# Patient Record
Sex: MALE | Race: OTHER | HISPANIC OR LATINO
[De-identification: names, ages, dates, MRNs, and addresses within clinical notes are randomized per-mention and may not be internally consistent; named-entity substitution may affect disease eponyms.]

---

## 2017-03-06 ENCOUNTER — APPOINTMENT (OUTPATIENT)
Dept: INTERNAL MEDICINE | Facility: CLINIC | Age: 52
End: 2017-03-06

## 2017-03-06 ENCOUNTER — OTHER (OUTPATIENT)
Age: 52
End: 2017-03-06

## 2017-03-06 ENCOUNTER — MED ADMIN CHARGE (OUTPATIENT)
Age: 52
End: 2017-03-06

## 2017-03-06 VITALS
DIASTOLIC BLOOD PRESSURE: 76 MMHG | SYSTOLIC BLOOD PRESSURE: 120 MMHG | HEART RATE: 74 BPM | TEMPERATURE: 98.1 F | OXYGEN SATURATION: 98 % | BODY MASS INDEX: 23.99 KG/M2 | HEIGHT: 69 IN | WEIGHT: 162 LBS

## 2017-03-06 DIAGNOSIS — Z83.3 FAMILY HISTORY OF DIABETES MELLITUS: ICD-10-CM

## 2017-03-06 DIAGNOSIS — Z83.2 FAMILY HISTORY OF DISEASES OF THE BLOOD AND BLOOD-FORMING ORGANS AND CERTAIN DISORDERS INVOLVING THE IMMUNE MECHANISM: ICD-10-CM

## 2017-03-06 DIAGNOSIS — Z00.00 ENCOUNTER FOR GENERAL ADULT MEDICAL EXAMINATION W/OUT ABNORMAL FINDINGS: ICD-10-CM

## 2017-03-06 DIAGNOSIS — Z82.49 FAMILY HISTORY OF ISCHEMIC HEART DISEASE AND OTHER DISEASES OF THE CIRCULATORY SYSTEM: ICD-10-CM

## 2017-03-06 DIAGNOSIS — Z86.39 PERSONAL HISTORY OF OTHER ENDOCRINE, NUTRITIONAL AND METABOLIC DISEASE: ICD-10-CM

## 2017-03-06 DIAGNOSIS — Z80.42 FAMILY HISTORY OF MALIGNANT NEOPLASM OF PROSTATE: ICD-10-CM

## 2017-03-06 RX ORDER — NAPROXEN 500 MG/1
500 TABLET ORAL
Qty: 28 | Refills: 0 | Status: DISCONTINUED | COMMUNITY
Start: 2017-02-04

## 2017-03-07 DIAGNOSIS — E11.9 TYPE 2 DIABETES MELLITUS W/OUT COMPLICATIONS: ICD-10-CM

## 2017-03-07 LAB
25(OH)D3 SERPL-MCNC: 13.4 NG/ML
ALBUMIN SERPL ELPH-MCNC: 4.7 G/DL
ALP BLD-CCNC: 94 U/L
ALT SERPL-CCNC: 37 U/L
ANION GAP SERPL CALC-SCNC: 17 MMOL/L
AST SERPL-CCNC: 23 U/L
BASOPHILS # BLD AUTO: 0.03 K/UL
BASOPHILS NFR BLD AUTO: 0.4 %
BILIRUB SERPL-MCNC: 0.4 MG/DL
BUN SERPL-MCNC: 14 MG/DL
CALCIUM SERPL-MCNC: 10.2 MG/DL
CHLORIDE SERPL-SCNC: 102 MMOL/L
CHOLEST SERPL-MCNC: 262 MG/DL
CHOLEST/HDLC SERPL: 3.5 RATIO
CO2 SERPL-SCNC: 23 MMOL/L
CREAT SERPL-MCNC: 0.82 MG/DL
EOSINOPHIL # BLD AUTO: 0.19 K/UL
EOSINOPHIL NFR BLD AUTO: 2.4 %
GLUCOSE SERPL-MCNC: 99 MG/DL
HBA1C MFR BLD HPLC: 6.5 %
HCT VFR BLD CALC: 46.9 %
HDLC SERPL-MCNC: 75 MG/DL
HGB BLD-MCNC: 15.1 G/DL
IMM GRANULOCYTES NFR BLD AUTO: 0.1 %
LDLC SERPL CALC-MCNC: 161 MG/DL
LYMPHOCYTES # BLD AUTO: 2.09 K/UL
LYMPHOCYTES NFR BLD AUTO: 26.3 %
MAN DIFF?: NORMAL
MCHC RBC-ENTMCNC: 30.2 PG
MCHC RBC-ENTMCNC: 32.2 GM/DL
MCV RBC AUTO: 93.8 FL
MONOCYTES # BLD AUTO: 0.85 K/UL
MONOCYTES NFR BLD AUTO: 10.7 %
NEUTROPHILS # BLD AUTO: 4.77 K/UL
NEUTROPHILS NFR BLD AUTO: 60.1 %
PLATELET # BLD AUTO: 286 K/UL
POTASSIUM SERPL-SCNC: 4.6 MMOL/L
PROT SERPL-MCNC: 7.6 G/DL
PSA SERPL-MCNC: 1.59 NG/ML
RBC # BLD: 5 M/UL
RBC # FLD: 13.2 %
SODIUM SERPL-SCNC: 142 MMOL/L
TRIGL SERPL-MCNC: 130 MG/DL
TSH SERPL-ACNC: 1.78 UIU/ML
WBC # FLD AUTO: 7.94 K/UL

## 2017-03-09 DIAGNOSIS — E78.5 HYPERLIPIDEMIA, UNSPECIFIED: ICD-10-CM

## 2017-03-10 PROBLEM — E78.5 HYPERLIPIDEMIA LDL GOAL <100: Status: ACTIVE | Noted: 2017-03-10

## 2017-03-10 RX ORDER — ATORVASTATIN CALCIUM 20 MG/1
20 TABLET, FILM COATED ORAL
Qty: 90 | Refills: 1 | Status: ACTIVE | COMMUNITY
Start: 2017-03-10 | End: 1900-01-01

## 2017-03-29 ENCOUNTER — APPOINTMENT (OUTPATIENT)
Dept: GASTROENTEROLOGY | Facility: CLINIC | Age: 52
End: 2017-03-29

## 2017-04-28 ENCOUNTER — APPOINTMENT (OUTPATIENT)
Dept: GASTROENTEROLOGY | Facility: CLINIC | Age: 52
End: 2017-04-28

## 2017-04-28 VITALS — TEMPERATURE: 98.1 F | RESPIRATION RATE: 16 BRPM | DIASTOLIC BLOOD PRESSURE: 80 MMHG | SYSTOLIC BLOOD PRESSURE: 120 MMHG

## 2017-04-28 DIAGNOSIS — M79.672 PAIN IN LEFT FOOT: ICD-10-CM

## 2017-04-28 DIAGNOSIS — Z12.11 ENCOUNTER FOR SCREENING FOR MALIGNANT NEOPLASM OF COLON: ICD-10-CM

## 2017-06-09 ENCOUNTER — APPOINTMENT (OUTPATIENT)
Dept: INTERNAL MEDICINE | Facility: CLINIC | Age: 52
End: 2017-06-09

## 2017-09-27 ENCOUNTER — APPOINTMENT (OUTPATIENT)
Dept: GASTROENTEROLOGY | Facility: HOSPITAL | Age: 52
End: 2017-09-27
Payer: COMMERCIAL

## 2017-09-27 ENCOUNTER — OUTPATIENT (OUTPATIENT)
Dept: OUTPATIENT SERVICES | Facility: HOSPITAL | Age: 52
LOS: 1 days | Discharge: HOME | End: 2017-09-27

## 2017-09-27 DIAGNOSIS — K62.5 HEMORRHAGE OF ANUS AND RECTUM: ICD-10-CM

## 2017-09-27 PROCEDURE — G0121 COLON CA SCRN NOT HI RSK IND: CPT

## 2020-12-15 PROBLEM — Z12.11 ENCOUNTER FOR SCREENING COLONOSCOPY: Status: RESOLVED | Noted: 2017-04-28 | Resolved: 2020-12-15

## 2021-11-08 ENCOUNTER — TRANSCRIPTION ENCOUNTER (OUTPATIENT)
Age: 56
End: 2021-11-08

## 2021-11-08 RX ORDER — POVIDONE-IODINE 5 %
1 AEROSOL (ML) TOPICAL ONCE
Refills: 0 | Status: COMPLETED | OUTPATIENT
Start: 2021-11-09 | End: 2021-11-09

## 2021-11-08 NOTE — H&P ADULT - NSICDXPASTMEDICALHX_GEN_ALL_CORE_FT
PAST MEDICAL HISTORY:  Back pain fall at work in 2020, back injury    DM (diabetes mellitus)     Lumbar radiculopathy

## 2021-11-08 NOTE — H&P ADULT - NSHPPHYSICALEXAM_GEN_ALL_CORE
MSK:  decreased ROM lumbar spine 2/2 pain  Remainder of physical exam as per medical clearance note Gen: 57 y/o, NAD  MSK: Decreased lumbar ROM secondary to pain  Skin without erythema, ecchymosis, abrasions or lesions, signs of infection  Calves soft, nontender bilaterally   Sensation intact to light touch bilateral lower extremities, though notes mild decrease on left thigh compared to right  Pulses: DP 2+; bilat LE brisk capillary refill  EHL/FHL/TA/GS 5/5 bilaterally     Rest of PE per MD clearance

## 2021-11-08 NOTE — H&P ADULT - NSHPLABSRESULTS_GEN_ALL_CORE
preop cbc/cmp/pt/inr/ptt/ua - within normal limits, reviewed by medical clearance   Cr 0.8  preop  covid pcr negative 11/6/21  EKG reviewed by medical clearance  CXR: Within normal limits, per medical clearance.

## 2021-11-08 NOTE — H&P ADULT - PROBLEM SELECTOR PLAN 1
Admit to Orthopaedic Service.  Presents today for elective L5-S1 lumbar decompression and fusion   Pt medically stable and cleared for procedure today by Dr. Elias

## 2021-11-08 NOTE — H&P ADULT - HISTORY OF PRESENT ILLNESS
56M with low back pain x   Presents for elective L5-S1 lumbar decompression and fusion  56M with low back pain since 2020 secondary to a work accident, where he fell down stairs/ladder. Since then he reports low back pain that radiates down both legs, with associated numbness, L>R. He has tried oral medications, injections, physical therapy, rest and time.     Presents for elective L5-S1 lumbar decompression and fusion

## 2021-11-09 ENCOUNTER — INPATIENT (INPATIENT)
Facility: HOSPITAL | Age: 56
LOS: 3 days | Discharge: HOME | DRG: 460 | End: 2021-11-13
Attending: ORTHOPAEDIC SURGERY | Admitting: ORTHOPAEDIC SURGERY
Payer: OTHER MISCELLANEOUS

## 2021-11-09 ENCOUNTER — TRANSCRIPTION ENCOUNTER (OUTPATIENT)
Age: 56
End: 2021-11-09

## 2021-11-09 VITALS
OXYGEN SATURATION: 99 % | WEIGHT: 172.62 LBS | RESPIRATION RATE: 16 BRPM | HEIGHT: 66 IN | SYSTOLIC BLOOD PRESSURE: 132 MMHG | HEART RATE: 80 BPM | DIASTOLIC BLOOD PRESSURE: 82 MMHG | TEMPERATURE: 97 F

## 2021-11-09 DIAGNOSIS — E11.9 TYPE 2 DIABETES MELLITUS WITHOUT COMPLICATIONS: ICD-10-CM

## 2021-11-09 DIAGNOSIS — M54.16 RADICULOPATHY, LUMBAR REGION: ICD-10-CM

## 2021-11-09 LAB
ANION GAP SERPL CALC-SCNC: 10 MMOL/L — SIGNIFICANT CHANGE UP (ref 5–17)
BLD GP AB SCN SERPL QL: NEGATIVE — SIGNIFICANT CHANGE UP
BUN SERPL-MCNC: 12 MG/DL — SIGNIFICANT CHANGE UP (ref 7–23)
CALCIUM SERPL-MCNC: 9.3 MG/DL — SIGNIFICANT CHANGE UP (ref 8.4–10.5)
CHLORIDE SERPL-SCNC: 105 MMOL/L — SIGNIFICANT CHANGE UP (ref 96–108)
CO2 SERPL-SCNC: 25 MMOL/L — SIGNIFICANT CHANGE UP (ref 22–31)
CREAT SERPL-MCNC: 0.77 MG/DL — SIGNIFICANT CHANGE UP (ref 0.5–1.3)
GLUCOSE BLDC GLUCOMTR-MCNC: 112 MG/DL — HIGH (ref 70–99)
GLUCOSE BLDC GLUCOMTR-MCNC: 117 MG/DL — HIGH (ref 70–99)
GLUCOSE BLDC GLUCOMTR-MCNC: 187 MG/DL — HIGH (ref 70–99)
GLUCOSE BLDC GLUCOMTR-MCNC: 187 MG/DL — HIGH (ref 70–99)
GLUCOSE BLDC GLUCOMTR-MCNC: 190 MG/DL — HIGH (ref 70–99)
GLUCOSE SERPL-MCNC: 138 MG/DL — HIGH (ref 70–99)
POTASSIUM SERPL-MCNC: 4.8 MMOL/L — SIGNIFICANT CHANGE UP (ref 3.5–5.3)
POTASSIUM SERPL-SCNC: 4.8 MMOL/L — SIGNIFICANT CHANGE UP (ref 3.5–5.3)
RH IG SCN BLD-IMP: NEGATIVE — SIGNIFICANT CHANGE UP
SODIUM SERPL-SCNC: 140 MMOL/L — SIGNIFICANT CHANGE UP (ref 135–145)

## 2021-11-09 RX ORDER — ONDANSETRON 8 MG/1
4 TABLET, FILM COATED ORAL EVERY 6 HOURS
Refills: 0 | Status: DISCONTINUED | OUTPATIENT
Start: 2021-11-09 | End: 2021-11-09

## 2021-11-09 RX ORDER — ERGOCALCIFEROL 1.25 MG/1
1 CAPSULE ORAL
Qty: 0 | Refills: 0 | DISCHARGE

## 2021-11-09 RX ORDER — CEFAZOLIN SODIUM 1 G
2000 VIAL (EA) INJECTION EVERY 8 HOURS
Refills: 0 | Status: DISCONTINUED | OUTPATIENT
Start: 2021-11-09 | End: 2021-11-09

## 2021-11-09 RX ORDER — CYCLOBENZAPRINE HYDROCHLORIDE 10 MG/1
5 TABLET, FILM COATED ORAL THREE TIMES A DAY
Refills: 0 | Status: DISCONTINUED | OUTPATIENT
Start: 2021-11-09 | End: 2021-11-13

## 2021-11-09 RX ORDER — HYDROMORPHONE HYDROCHLORIDE 2 MG/ML
30 INJECTION INTRAMUSCULAR; INTRAVENOUS; SUBCUTANEOUS
Refills: 0 | Status: DISCONTINUED | OUTPATIENT
Start: 2021-11-09 | End: 2021-11-10

## 2021-11-09 RX ORDER — ACETAMINOPHEN 500 MG
1000 TABLET ORAL ONCE
Refills: 0 | Status: COMPLETED | OUTPATIENT
Start: 2021-11-09 | End: 2021-11-09

## 2021-11-09 RX ORDER — CHLORHEXIDINE GLUCONATE 213 G/1000ML
1 SOLUTION TOPICAL ONCE
Refills: 0 | Status: COMPLETED | OUTPATIENT
Start: 2021-11-09 | End: 2021-11-09

## 2021-11-09 RX ORDER — ACETAMINOPHEN 500 MG
975 TABLET ORAL EVERY 8 HOURS
Refills: 0 | Status: DISCONTINUED | OUTPATIENT
Start: 2021-11-09 | End: 2021-11-13

## 2021-11-09 RX ORDER — DEXTROSE 50 % IN WATER 50 %
15 SYRINGE (ML) INTRAVENOUS ONCE
Refills: 0 | Status: DISCONTINUED | OUTPATIENT
Start: 2021-11-09 | End: 2021-11-13

## 2021-11-09 RX ORDER — GLUCAGON INJECTION, SOLUTION 0.5 MG/.1ML
1 INJECTION, SOLUTION SUBCUTANEOUS ONCE
Refills: 0 | Status: DISCONTINUED | OUTPATIENT
Start: 2021-11-09 | End: 2021-11-13

## 2021-11-09 RX ORDER — METFORMIN HYDROCHLORIDE 850 MG/1
0 TABLET ORAL
Qty: 0 | Refills: 0 | DISCHARGE

## 2021-11-09 RX ORDER — CEFAZOLIN SODIUM 1 G
2000 VIAL (EA) INJECTION EVERY 8 HOURS
Refills: 0 | Status: COMPLETED | OUTPATIENT
Start: 2021-11-09 | End: 2021-11-10

## 2021-11-09 RX ORDER — NALOXONE HYDROCHLORIDE 4 MG/.1ML
0.1 SPRAY NASAL
Refills: 0 | Status: DISCONTINUED | OUTPATIENT
Start: 2021-11-09 | End: 2021-11-13

## 2021-11-09 RX ORDER — HYDROMORPHONE HYDROCHLORIDE 2 MG/ML
0.5 INJECTION INTRAMUSCULAR; INTRAVENOUS; SUBCUTANEOUS
Refills: 0 | Status: DISCONTINUED | OUTPATIENT
Start: 2021-11-09 | End: 2021-11-10

## 2021-11-09 RX ORDER — GABAPENTIN 400 MG/1
300 CAPSULE ORAL ONCE
Refills: 0 | Status: COMPLETED | OUTPATIENT
Start: 2021-11-09 | End: 2021-11-09

## 2021-11-09 RX ORDER — DEXTROSE 50 % IN WATER 50 %
25 SYRINGE (ML) INTRAVENOUS ONCE
Refills: 0 | Status: DISCONTINUED | OUTPATIENT
Start: 2021-11-09 | End: 2021-11-13

## 2021-11-09 RX ORDER — SODIUM CHLORIDE 9 MG/ML
1000 INJECTION, SOLUTION INTRAVENOUS
Refills: 0 | Status: DISCONTINUED | OUTPATIENT
Start: 2021-11-09 | End: 2021-11-13

## 2021-11-09 RX ORDER — ONDANSETRON 8 MG/1
4 TABLET, FILM COATED ORAL EVERY 6 HOURS
Refills: 0 | Status: DISCONTINUED | OUTPATIENT
Start: 2021-11-09 | End: 2021-11-13

## 2021-11-09 RX ORDER — HYDROMORPHONE HYDROCHLORIDE 2 MG/ML
0.5 INJECTION INTRAMUSCULAR; INTRAVENOUS; SUBCUTANEOUS
Refills: 0 | Status: DISCONTINUED | OUTPATIENT
Start: 2021-11-09 | End: 2021-11-13

## 2021-11-09 RX ORDER — APREPITANT 80 MG/1
40 CAPSULE ORAL ONCE
Refills: 0 | Status: COMPLETED | OUTPATIENT
Start: 2021-11-09 | End: 2021-11-09

## 2021-11-09 RX ORDER — DEXTROSE 50 % IN WATER 50 %
12.5 SYRINGE (ML) INTRAVENOUS ONCE
Refills: 0 | Status: DISCONTINUED | OUTPATIENT
Start: 2021-11-09 | End: 2021-11-13

## 2021-11-09 RX ORDER — INSULIN LISPRO 100/ML
VIAL (ML) SUBCUTANEOUS
Refills: 0 | Status: DISCONTINUED | OUTPATIENT
Start: 2021-11-09 | End: 2021-11-13

## 2021-11-09 RX ORDER — SENNA PLUS 8.6 MG/1
2 TABLET ORAL AT BEDTIME
Refills: 0 | Status: DISCONTINUED | OUTPATIENT
Start: 2021-11-09 | End: 2021-11-13

## 2021-11-09 RX ORDER — INSULIN LISPRO 100/ML
VIAL (ML) SUBCUTANEOUS
Refills: 0 | Status: DISCONTINUED | OUTPATIENT
Start: 2021-11-09 | End: 2021-11-09

## 2021-11-09 RX ADMIN — HYDROMORPHONE HYDROCHLORIDE 0.5 MILLIGRAM(S): 2 INJECTION INTRAMUSCULAR; INTRAVENOUS; SUBCUTANEOUS at 13:02

## 2021-11-09 RX ADMIN — APREPITANT 40 MILLIGRAM(S): 80 CAPSULE ORAL at 08:34

## 2021-11-09 RX ADMIN — Medication 975 MILLIGRAM(S): at 22:55

## 2021-11-09 RX ADMIN — Medication 2: at 17:29

## 2021-11-09 RX ADMIN — Medication 975 MILLIGRAM(S): at 21:55

## 2021-11-09 RX ADMIN — CHLORHEXIDINE GLUCONATE 1 APPLICATION(S): 213 SOLUTION TOPICAL at 08:27

## 2021-11-09 RX ADMIN — SENNA PLUS 2 TABLET(S): 8.6 TABLET ORAL at 21:55

## 2021-11-09 RX ADMIN — Medication 100 MILLIGRAM(S): at 19:18

## 2021-11-09 RX ADMIN — Medication 1000 MILLIGRAM(S): at 08:34

## 2021-11-09 RX ADMIN — GABAPENTIN 300 MILLIGRAM(S): 400 CAPSULE ORAL at 08:34

## 2021-11-09 RX ADMIN — HYDROMORPHONE HYDROCHLORIDE 0.5 MILLIGRAM(S): 2 INJECTION INTRAMUSCULAR; INTRAVENOUS; SUBCUTANEOUS at 13:12

## 2021-11-09 RX ADMIN — Medication 1000 MILLIGRAM(S): at 08:35

## 2021-11-09 RX ADMIN — Medication 1 APPLICATION(S): at 08:27

## 2021-11-09 RX ADMIN — Medication 2: at 23:28

## 2021-11-09 RX ADMIN — CYCLOBENZAPRINE HYDROCHLORIDE 5 MILLIGRAM(S): 10 TABLET, FILM COATED ORAL at 21:55

## 2021-11-09 RX ADMIN — HYDROMORPHONE HYDROCHLORIDE 30 MILLILITER(S): 2 INJECTION INTRAMUSCULAR; INTRAVENOUS; SUBCUTANEOUS at 13:52

## 2021-11-09 NOTE — CONSULT NOTE ADULT - SUBJECTIVE AND OBJECTIVE BOX
JEFFERY YUN      Patient is a 56y old  Male who presents with a chief complaint of Low back pain (09 Nov 2021 14:27)      HPI:  56M with low back pain since 2020 secondary to a work accident, where he fell down stairs/ladder. Since then he reports low back pain that radiates down both legs, with associated numbness, L>R. He has tried oral medications, injections, physical therapy, rest and time.     Presents for elective L5-S1 lumbar decompression and fusion  (08 Nov 2021 08:59)      Addl  Medical issues:       HEALTH ISSUES - PROBLEM Dx:  Lumbar radiculopathy    DM (diabetes mellitus)              MEDICATIONS  (STANDING):  acetaminophen     Tablet .. 975 milliGRAM(s) Oral every 8 hours  ceFAZolin   IVPB 2000 milliGRAM(s) IV Intermittent every 8 hours  cyclobenzaprine 5 milliGRAM(s) Oral three times a day  dextrose 40% Gel 15 Gram(s) Oral once  dextrose 5%. 1000 milliLiter(s) (50 mL/Hr) IV Continuous <Continuous>  dextrose 5%. 1000 milliLiter(s) (100 mL/Hr) IV Continuous <Continuous>  dextrose 50% Injectable 25 Gram(s) IV Push once  dextrose 50% Injectable 12.5 Gram(s) IV Push once  dextrose 50% Injectable 25 Gram(s) IV Push once  glucagon  Injectable 1 milliGRAM(s) IntraMuscular once  HYDROmorphone PCA (1 mG/mL) 30 milliLiter(s) PCA Continuous PCA Continuous  insulin lispro (ADMELOG) corrective regimen sliding scale   SubCutaneous three times a day before meals  lactated ringers. 1000 milliLiter(s) (120 mL/Hr) IV Continuous <Continuous>  multivitamin 1 Tablet(s) Oral daily  senna 2 Tablet(s) Oral at bedtime    MEDICATIONS  (PRN):  HYDROmorphone  Injectable 0.5 milliGRAM(s) IV Push every 15 minutes PRN Severe Pain (7 - 10)  HYDROmorphone PCA (1 mG/mL) Rescue Clinician Bolus 0.5 milliGRAM(s) IV Push every 2 hours PRN for Pain Scale GREATER THAN 6  naloxone Injectable 0.1 milliGRAM(s) IV Push every 3 minutes PRN For ANY of the following changes in patient status:  A. RR LESS THAN 10 breaths per minute, B. Oxygen saturation LESS THAN 90%, C. Sedation score of 6  ondansetron Injectable 4 milliGRAM(s) IV Push every 6 hours PRN Nausea          PAST MEDICAL & SURGICAL HISTORY:  Lumbar radiculopathy    DM (diabetes mellitus)    Back pain  fall at work in 2020, back injury    PHYSICAL EXAM:      Constitutional: NAD, well-groomed, well-developed  HEENT: PERRLA, EOMI, Normal Hearing, MMM  Neck: No LAD, No JVD  Back: Normal spine flexure, No CVA tenderness  Respiratory: CTABCardiovascular: S1 and S2, RRR, no M/G/R  Gastrointestinal: BS+, soft, NT/ND  Extremities: No peripheral edema  Vascular: 2+ peripheral pulses  Neurological: A/O x 3, no focal deficits  Psychiatric: Normal mood, normal affect  Musculoskeletal: 5/5 strength b/l upper and lower extremities  Skin: No rashes    No significant past surgical history        REVIEW OF SYSTEMS:  [x] As per HPI          Reviewed   no change                            Changes noted  CONSTITUTIONAL: No fever, weight loss, or fatigue  RESPIRATORY: No cough, wheezing, chills or hemoptysis; No Shortness of Breath  CARDIOVASCULAR: No chest pain, palpitations, dizziness, or leg swelling  GASTROINTESTINAL: No abdominal or epigastric pain. No nausea, vomiting, or hematemesis; No diarrhea or constipation. No melena or hematochezia.  MUSCULOSKELETAL: No joint pain or swelling; No muscle, back, or extremity pain  l5  s1  procedure  Neuro:   Grossly  Negative  Psych        Awake  alert  [x] All others negative	  [ ] Unable to obtain      Vital Signs Last 24 Hrs  T(C): 36.8 (09 Nov 2021 16:04), Max: 36.8 (09 Nov 2021 16:04)  T(F): 98.3 (09 Nov 2021 16:04), Max: 98.3 (09 Nov 2021 16:04)  HR: 85 (09 Nov 2021 16:04) (73 - 85)  BP: 100/67 (09 Nov 2021 16:04) (100/62 - 132/82)  BP(mean): 94 (09 Nov 2021 14:15) (76 - 95)  RR: 16 (09 Nov 2021 16:04) (13 - 18)  SpO2: 98% (09 Nov 2021 16:04) (98% - 100%)    PHYSICAL EXAM:    PHYSICAL EXAM:      Constitutional: NAD, well-groomed, well-developed  HEENT: PERRLA, EOMI, Normal Hearing, MMM  Neck: No LAD, No JVD  Back: Normal spine flexure, No CVA tenderness  Respiratory: CTA B  Cardiovascular: S1 and S2, RRR, no M/G/R  Gastrointestinal: BS+, soft, NT/ND  Extremities: No peripheral edema  Vascular: 2+ peripheral pulses  Neurological: A/O x 3, no focal deficits  Psychiatric: Normal mood, normal affect  Musculoskeletal: 5/5 strength s/p L spine  Skin: No rashes              11-09    140  |  105  |  12  ----------------------------<  138<H>  4.8   |  25  |  0.77    Ca    9.3      09 Nov 2021 14:07            CAPILLARY BLOOD GLUCOSE      POCT Blood Glucose.: 187 mg/dL (09 Nov 2021 17:13)  POCT Blood Glucose.: 117 mg/dL (09 Nov 2021 13:00)  POCT Blood Glucose.: 112 mg/dL (09 Nov 2021 07:57)      I&O's Summary          ASSESSMENT/PLAN/RECOMMENDATIONS    
  Pain Management Consult Note - [facility] [phone]    Chief Complaint: low back pain     HPI: Patient seen and examined today. This is a 57 y/o male PMH of DM scheduled for lumbar decompression and fusion L5-S1 with Dr. Dale. Patient reports endorsing low back pain since a work related accident in 2/21/2020. Patient reports low back pain radiates down the bilateral lower extremities, left side worse than right side. Patient reports associated numbness, tingling, and weakness in the bilateral lower extremities. At home, patient reports taking Ibuprofen and a muscle relaxer that he does not recall the name. Patient is ISTOP negative. Discussed plan to start Dilaudid PCA post-operatively, patient questions were answered and patient verbalized understanding.   reference ID: [id number]    Pain is __X_ sharp ____dull ___burning ___achy ___ Intensity: ____ mild _X__mod __X_severe     Location __X__surgical site ____cervical _X____lumbar ____abd ____upper ext____lower ext    Worse with _X___activity _X___movement _____physical therapy___ Rest    Improved with __X__medication __X__rest ____physical therapy    ROS: Const:  __N_febrile   Eyes:___ENT:___CV: N___chest pain  Resp: ___N_sob  GI:__N_nausea _N__vomiting _N__abd pain _Y__npo ___clears __full diet __bm  :___ Musk: _Y__pain ___spasm  Skin:__N_ Neuro:  _N__sedation_N__confusion__Y_ numbness _Y__weakness Y___paresth  Psych:__anxiety  Endo:__N_ Heme:_N__Allergy:_____NKDA____, __Y_all others reviewed and negative    PAST MEDICAL & SURGICAL HISTORY:  Lumbar radiculopathy  DM (diabetes mellitus)  Back pain  fall at work in 2020, back injury  No significant past surgical history    SH: _N__Tobacco   _N__Alcohol                          FH:FAMILY HISTORY: no pertinent medical history in first degree relatives    T(C): 36.3 (11-09-21 @ 08:06), Max: 36.3 (11-09-21 @ 08:06)  HR: 80 (11-09-21 @ 08:06) (80 - 80)  BP: 132/82 (11-09-21 @ 08:06) (132/82 - 132/82)  RR: 16 (11-09-21 @ 08:06) (16 - 16)  SpO2: 99% (11-09-21 @ 08:06) (99% - 99%)  Wt(kg): --    T(C): 36.3 (11-09-21 @ 08:06), Max: 36.3 (11-09-21 @ 08:06)  HR: 80 (11-09-21 @ 08:06) (80 - 80)  BP: 132/82 (11-09-21 @ 08:06) (132/82 - 132/82)  RR: 16 (11-09-21 @ 08:06) (16 - 16)  SpO2: 99% (11-09-21 @ 08:06) (99% - 99%)  Wt(kg): --    T(C): 36.3 (11-09-21 @ 08:06), Max: 36.3 (11-09-21 @ 08:06)  HR: 80 (11-09-21 @ 08:06) (80 - 80)  BP: 132/82 (11-09-21 @ 08:06) (132/82 - 132/82)  RR: 16 (11-09-21 @ 08:06) (16 - 16)  SpO2: 99% (11-09-21 @ 08:06) (99% - 99%)  Wt(kg): --    PHYSICAL EXAM:  Gen Appearance: _X__no acute distress __X_appropriate        Neuro: _X__SILT feet____ EOM Intact Psych: AAOX_3_, _X__mood/affect appropriate        Eyes: _X__conjunctiva WNL  __X___ Pupils equal and round        ENT: X___ears and nose atraumatic_X__ Hearing grossly intact        Neck: _X__trachea midline, no visible masses ___thyroid without palpable mass    Resp: _X__Nml WOB____No tactile fremitus ___clear to auscultation    Cardio: _X__extremities free from edema _X___pedal pulses palpable    GI/Abdomen: ___soft _____ Nontender______Nondistended_____HSM    Lymphatic: ___no palpable nodes in neck  ___no palpable nodes calves and feet    Skin/Wound: ___Incision, ___Dressing c/d/i,   ____surrounding tissues soft,  ___drain/chest tube present____    Muscular: EHL _5_/5  Gastrocnemius__5_/5    _X__absent clubbing/cyanosis      ASSESSMENT: This is a 56y old Male with a history of DM scheduled for lumbar decompression and fusion L5-S1 with Dr. Dale.    Recommended Treatment PLAN:  1. Dilaudid PCA 0.2 mg [id number], 9 mg 4hr max; Dilaudid 0.5 mg 2 hr rescue clinician bolus  2. Tylenol 975 mg PO TID  3. Flexeril 5 mg PO TID  Plan discussed with Dr. Hidalgo

## 2021-11-09 NOTE — PROGRESS NOTE ADULT - SUBJECTIVE AND OBJECTIVE BOX
Ortho Post Op Check    Procedure: L5-S1 PSF  Surgeon:    Pt comfortable without complaints, pain controlled  Denies CP, SOB, N/V  Reports bilateral LE numbness/tingling but reports that there has been no change since before the procedure    Vital Signs Last 24 Hrs  T(C): 36.2 (11-09-21 @ 12:45), Max: 36.2 (11-09-21 @ 12:45)  T(F): 97.1 (11-09-21 @ 12:45), Max: 97.1 (11-09-21 @ 12:45)  HR: 75 (11-09-21 @ 13:55) (73 - 84)  BP: 126/72 (11-09-21 @ 13:55) (100/62 - 126/72)  BP(mean): 93 (11-09-21 @ 13:55) (76 - 95)  RR: 15 (11-09-21 @ 13:55) (13 - 18)  SpO2: --  I&O's Summary      General: Pt Alert and oriented, NAD  DSG C/D/I  Pulses: 2+ PT/DP bilaterally  Sensation: SILT bilaterally  Motor: 4-5/5 EHL/FHL/TA/GS bilaterally              Post-op X-Ray:    A/P: 56yMale POD#0 s/p L5-S1 PSF  - Stable, VSS  - Pain Control  - DVT ppx: SCDs  - Post op abx: Ancef  - PT, WBS: WBAT  - Dispo: Pending PT evaluation    Ortho Pager [phone] Ortho Post Op Check    Procedure: L5-S1 PSF  Surgeon: Suyapa    Pt comfortable without complaints, pain controlled  Denies CP, SOB, N/V  Reports bilateral LE numbness/tingling but reports that there has been no change since before the procedure    Vital Signs Last 24 Hrs  T(C): 36.2 (11-09-21 @ 12:45), Max: 36.2 (11-09-21 @ 12:45)  T(F): 97.1 (11-09-21 @ 12:45), Max: 97.1 (11-09-21 @ 12:45)  HR: 75 (11-09-21 @ 13:55) (73 - 84)  BP: 126/72 (11-09-21 @ 13:55) (100/62 - 126/72)  BP(mean): 93 (11-09-21 @ 13:55) (76 - 95)  RR: 15 (11-09-21 @ 13:55) (13 - 18)  SpO2: --  I&O's Summary      General: Pt Alert and oriented, NAD  DSG C/D/I  Pulses: 2+ PT/DP bilaterally  Sensation: SILT bilaterally  Motor: 4-5/5 EHL/FHL/TA/GS bilaterally              Post-op X-Ray:    A/P: 56yMale POD#0 s/p L5-S1 PSF  - Stable, VSS  - Pain Control  - DVT ppx: SCDs  - Post op abx: Ancef  - PT, WBS: WBAT  - Dispo: Pending PT evaluation    Ortho Pager [phone] Ortho Post Op Check    Procedure: L5-S1 PSF  Surgeon: Suyapa    Pt comfortable without complaints, pain controlled.  Denies CP, SOB, N/V  Reports bilateral LE numbness/tingling but reports that there has been no change since before the procedure.    Vital Signs Last 24 Hrs  T(C): 36.2 (11-09-21 @ 12:45), Max: 36.2 (11-09-21 @ 12:45)  T(F): 97.1 (11-09-21 @ 12:45), Max: 97.1 (11-09-21 @ 12:45)  HR: 75 (11-09-21 @ 13:55) (73 - 84)  BP: 126/72 (11-09-21 @ 13:55) (100/62 - 126/72)  BP(mean): 93 (11-09-21 @ 13:55) (76 - 95)  RR: 15 (11-09-21 @ 13:55) (13 - 18)  SpO2: --  I&O's Summary      General: Pt Alert and oriented, NAD  DSG C/D/I  Hemovac x1 to lumbar area, intact  Pulses: 2+ PT/DP bilaterally  Sensation: SILT bilaterally  Motor: 5/5 EHL/FHL/TA/GS bilaterally          A/P: 56yMale POD#0 s/p L5-S1 PSF  - Stable, VSS  - Pain Control  - DVT ppx: SCDs  - Post op abx: Ancef  - PT, WBS: WBAT  - Dispo: Pending PT evaluation    Ortho Pager [phone] Ortho Post Op Check    Procedure: L5-S1 PSF  Surgeon: Suyapa    Pt comfortable without complaints, pain controlled.  Denies CP, SOB, N/V  Reports bilateral LE numbness/tingling but reports that there has been no change since before the procedure.    Vital Signs Last 24 Hrs  T(C): 36.2 (11-09-21 @ 12:45), Max: 36.2 (11-09-21 @ 12:45)  T(F): 97.1 (11-09-21 @ 12:45), Max: 97.1 (11-09-21 @ 12:45)  HR: 75 (11-09-21 @ 13:55) (73 - 84)  BP: 126/72 (11-09-21 @ 13:55) (100/62 - 126/72)  BP(mean): 93 (11-09-21 @ 13:55) (76 - 95)  RR: 15 (11-09-21 @ 13:55) (13 - 18)  SpO2: --  I&O's Summary      General: Pt Alert and oriented, NAD  DSG C/D/I  Hemovac x1 to lumbar area, intact  Pulses: 2+ PT/DP bilaterally  Sensation: SILT bilaterally  Motor: 5/5 EHL/FHL/TA/GS bilaterally          A/P: 56yMale POD#0 s/p L5-S1 PSF  - Stable, VSS  - Pain Control  - DVT ppx: SCDs  - Post op abx: Ancef  - PT, WBS: WBAT  - Dispo: Pending PT evaluation    Ortho Pager [phone]  Pt. seen and evaluated with MALI. Agree with above and edited.   Ros Ochoa PA-C

## 2021-11-09 NOTE — DISCHARGE NOTE PROVIDER - CARE PROVIDER_API CALL
Chacho Dale (DO)  Orthopaedic Surgery  [address] [place], [place] [address]  Phone: [phone]  Fax: [phone]  Follow Up Time: 2 weeks

## 2021-11-09 NOTE — DISCHARGE NOTE PROVIDER - HOSPITAL COURSE
Admitted 11/9/21  Surgery  L5-S1 lumbar decompression and fusion   Jazmine-op Antibiotics  Pain control  DVT prophylaxis  OOB/Physical Therapy

## 2021-11-09 NOTE — DISCHARGE NOTE PROVIDER - NSDCMRMEDTOKEN_GEN_ALL_CORE_FT
ergocalciferol 1.25 mg (50,000 intl units) oral capsule: 1 cap(s) orally once a week  ibuprofen 600 mg oral tablet: 1 tab(s) orally every 6 hours, As Needed  metFORMIN 500 mg oral tablet: orally once a day   acetaminophen 325 mg oral tablet: 3 tab(s) orally every 8 hours  cyclobenzaprine 5 mg oral tablet: 1 tab(s) orally 3 times a day  ergocalciferol 1.25 mg (50,000 intl units) oral capsule: 1 cap(s) orally once a week  Lumbar corset x 1 s/p Lami/PSF L5-S1 :   melatonin 5 mg oral tablet: 1 tab(s) orally once a day (at bedtime), As needed, Insomnia  metFORMIN 500 mg oral tablet: orally once a day  Multiple Vitamins oral tablet: 1 tab(s) orally once a day  oxyCODONE 10 mg oral tablet: 1 tab(s) orally every 4 hours, As needed, Severe Pain (7 - 10)  oxyCODONE 5 mg oral tablet: 1 tab(s) orally every 4 hours, As needed, Moderate Pain (4 - 6)  pantoprazole 40 mg oral delayed release tablet: 1 tab(s) orally once a day (before a meal)  senna oral tablet: 2 tab(s) orally once a day (at bedtime)   acetaminophen 325 mg oral tablet: 3 tab(s) orally every 8 hours  cyclobenzaprine 5 mg oral tablet: 1 tab(s) orally 3 times a day MDD:3  ergocalciferol 1.25 mg (50,000 intl units) oral capsule: 1 cap(s) orally once a week  Keflex 500 mg oral capsule: 1 cap(s) orally 2 times a day MDD:2  Lumbar corset x 1 s/p Lami/PSF L5-S1 :   Medrol Dosepak 4 mg oral tablet: 1 packet(s) orally once a day   take as directed on packet  melatonin 5 mg oral tablet: 1 tab(s) orally once a day (at bedtime), As needed, Insomnia  metFORMIN 500 mg oral tablet: orally once a day  Multiple Vitamins oral tablet: 1 tab(s) orally once a day  oxyCODONE 5 mg oral tablet: 1 tab(s) orally every 4 hours, As needed, severe Pain (7 - 10) MDD:6  pantoprazole 40 mg oral delayed release tablet: 1 tab(s) orally once a day (before a meal)  senna oral tablet: 2 tab(s) orally once a day (at bedtime)   acetaminophen 325 mg oral tablet: 3 tab(s) orally every 8 hours  cyclobenzaprine 5 mg oral tablet: 1 tab(s) orally 3 times a day MDD:3  ergocalciferol 1.25 mg (50,000 intl units) oral capsule: 1 cap(s) orally once a week  Keflex 500 mg oral capsule: 1 cap(s) orally 2 times a day MDD:2  Lumbar corset x 1 s/p Lami/PSF L5-S1 :   Medrol Dosepak 4 mg oral tablet: 1 packet(s) orally once a day   take as directed on packet  melatonin 5 mg oral tablet: 1 tab(s) orally once a day (at bedtime), As needed, Insomnia  metFORMIN 500 mg oral tablet: orally once a day  Multiple Vitamins oral tablet: 1 tab(s) orally once a day  oxyCODONE 5 mg oral tablet: 1 tab(s) orally every 4 hours, As needed, severe Pain (7 - 10) MDD:6  pantoprazole 40 mg oral delayed release tablet: 1 tab(s) orally once a day (before a meal)  PHYSICAL THERAPY: DX: Lumbar radiculopathy  Surgery: PSF L5-S1    OUTPT PT 2-3x/week for 6-8 weeks  senna oral tablet: 2 tab(s) orally once a day (at bedtime)

## 2021-11-09 NOTE — DISCHARGE NOTE PROVIDER - NSDCACTIVITY_GEN_ALL_CORE
Do not drive or operate machinery/Showering allowed/No heavy lifting/straining/Follow Instructions Provided by your Surgical Team

## 2021-11-09 NOTE — DISCHARGE NOTE PROVIDER - NSDCCPTREATMENT_GEN_ALL_CORE_FT
PRINCIPAL PROCEDURE  Procedure: Fusion, posterior spinal column, lumbar, 1 level, posterior approach  Findings and Treatment: lumbar radiculopathy

## 2021-11-09 NOTE — BRIEF OPERATIVE NOTE - NSICDXBRIEFPROCEDURE_GEN_ALL_CORE_FT
PROCEDURES:  Fusion, posterior spinal column, lumbar, 1 level, posterior approach 09-Nov-2021 15:48:25  Goldberg, Rebecca L

## 2021-11-09 NOTE — CONSULT NOTE ADULT - ATTENDING COMMENTS
56 y.o. male going for lumbar fusion surgery.  Pt was evaluated for pain control. Chart reviewed, medications reviewed. We will consider comprehensive medical management and titrate pain control and side effects. We will follow up. Dr. Hidalgo

## 2021-11-09 NOTE — DISCHARGE NOTE PROVIDER - NSDCCPCAREPLAN_GEN_ALL_CORE_FT
PRINCIPAL DISCHARGE DIAGNOSIS  Diagnosis: Lumbar radiculopathy  Assessment and Plan of Treatment:        PRINCIPAL DISCHARGE DIAGNOSIS  Diagnosis: Lumbar radiculopathy  Assessment and Plan of Treatment: s/p L5-S1 lami/psf

## 2021-11-09 NOTE — DISCHARGE NOTE PROVIDER - NSDCFUADDINST_GEN_ALL_CORE_FT
Weight bear as tolerated with assistive device.    No strenuous activity, heavy lifting, driving, exercising or returning to work until cleared by MD.    You may shower - dressing is water-resistant, no soaking in bathtubs.    Remove dressing after post op day 5-7, then leave incision open to air. Keep incision clean and dry.    Try to have regular bowel movements, take stool softener or laxative if necessary.    May take Pepcid or Prilosec for upset stomach.    May take Aleve or Naproxen if needed.    Do not apply any creams or ointments on the incision or around the incision/dressing.    Swelling may travel all the way down leg to foot, this is normal and will subside in a few weeks.    Call to schedule an appt with Dr. Dale for follow up, if you have staples or sutures they will be removed in office.    Contact your doctor if you experience: fever greater than 101.5, chills, chest pain, difficulty breathing, redness or excessive drainage around the incision, other concerns.    Follow up with your primary care provider.   Weight bear as tolerated with assistive device.    No strenuous activity, heavy lifting, driving, exercising or returning to work until cleared by MD.    You may shower - dressing is water-resistant, no soaking in bathtubs.    Remove dressing after post op day 5-7, then leave incision open to air. Keep incision clean and dry.    Try to have regular bowel movements, take stool softener or laxative if necessary.    May take Pepcid or Prilosec for upset stomach.    May take Aleve or Naproxen if needed.    Do not apply any creams or ointments on the incision or around the incision/dressing.    Swelling may travel all the way down leg to foot, this is normal and will subside in a few weeks.    Call to schedule an appt with Dr. Dale for follow up, if you have staples or sutures they will be removed in office.    Contact your doctor if you experience: fever greater than 101.5, chills, chest pain, difficulty breathing, redness or excessive drainage around the incision, other concerns.    Follow up with your primary care provider.    Take the antibiotic and the medrol dose pack completely and as prescribed.

## 2021-11-10 LAB
A1C WITH ESTIMATED AVERAGE GLUCOSE RESULT: 6 % — HIGH (ref 4–5.6)
ANION GAP SERPL CALC-SCNC: 9 MMOL/L — SIGNIFICANT CHANGE UP (ref 5–17)
BASOPHILS # BLD AUTO: 0.02 K/UL — SIGNIFICANT CHANGE UP (ref 0–0.2)
BASOPHILS NFR BLD AUTO: 0.2 % — SIGNIFICANT CHANGE UP (ref 0–2)
BUN SERPL-MCNC: 10 MG/DL — SIGNIFICANT CHANGE UP (ref 7–23)
CALCIUM SERPL-MCNC: 9.1 MG/DL — SIGNIFICANT CHANGE UP (ref 8.4–10.5)
CHLORIDE SERPL-SCNC: 103 MMOL/L — SIGNIFICANT CHANGE UP (ref 96–108)
CO2 SERPL-SCNC: 24 MMOL/L — SIGNIFICANT CHANGE UP (ref 22–31)
COVID-19 NUCLEOCAPSID GAM AB INTERP: POSITIVE
COVID-19 NUCLEOCAPSID TOTAL GAM ANTIBODY RESULT: 1.92 INDEX — HIGH
COVID-19 SPIKE DOMAIN AB INTERP: POSITIVE
COVID-19 SPIKE DOMAIN ANTIBODY RESULT: 53.3 U/ML — HIGH
CREAT SERPL-MCNC: 0.7 MG/DL — SIGNIFICANT CHANGE UP (ref 0.5–1.3)
EOSINOPHIL # BLD AUTO: 0 K/UL — SIGNIFICANT CHANGE UP (ref 0–0.5)
EOSINOPHIL NFR BLD AUTO: 0 % — SIGNIFICANT CHANGE UP (ref 0–6)
ESTIMATED AVERAGE GLUCOSE: 126 MG/DL — HIGH (ref 68–114)
GLUCOSE BLDC GLUCOMTR-MCNC: 142 MG/DL — HIGH (ref 70–99)
GLUCOSE BLDC GLUCOMTR-MCNC: 163 MG/DL — HIGH (ref 70–99)
GLUCOSE BLDC GLUCOMTR-MCNC: 164 MG/DL — HIGH (ref 70–99)
GLUCOSE BLDC GLUCOMTR-MCNC: 166 MG/DL — HIGH (ref 70–99)
GLUCOSE BLDC GLUCOMTR-MCNC: 186 MG/DL — HIGH (ref 70–99)
GLUCOSE SERPL-MCNC: 186 MG/DL — HIGH (ref 70–99)
HCT VFR BLD CALC: 37.8 % — LOW (ref 39–50)
HCV AB S/CO SERPL IA: 0.04 S/CO — SIGNIFICANT CHANGE UP
HCV AB SERPL-IMP: SIGNIFICANT CHANGE UP
HGB BLD-MCNC: 12.7 G/DL — LOW (ref 13–17)
IMM GRANULOCYTES NFR BLD AUTO: 0.3 % — SIGNIFICANT CHANGE UP (ref 0–1.5)
LYMPHOCYTES # BLD AUTO: 1.18 K/UL — SIGNIFICANT CHANGE UP (ref 1–3.3)
LYMPHOCYTES # BLD AUTO: 9.7 % — LOW (ref 13–44)
MCHC RBC-ENTMCNC: 32 PG — SIGNIFICANT CHANGE UP (ref 27–34)
MCHC RBC-ENTMCNC: 33.6 GM/DL — SIGNIFICANT CHANGE UP (ref 32–36)
MCV RBC AUTO: 95.2 FL — SIGNIFICANT CHANGE UP (ref 80–100)
MONOCYTES # BLD AUTO: 1.3 K/UL — HIGH (ref 0–0.9)
MONOCYTES NFR BLD AUTO: 10.7 % — SIGNIFICANT CHANGE UP (ref 2–14)
NEUTROPHILS # BLD AUTO: 9.6 K/UL — HIGH (ref 1.8–7.4)
NEUTROPHILS NFR BLD AUTO: 79.1 % — HIGH (ref 43–77)
NRBC # BLD: 0 /100 WBCS — SIGNIFICANT CHANGE UP (ref 0–0)
PLATELET # BLD AUTO: 211 K/UL — SIGNIFICANT CHANGE UP (ref 150–400)
POTASSIUM SERPL-MCNC: 4.3 MMOL/L — SIGNIFICANT CHANGE UP (ref 3.5–5.3)
POTASSIUM SERPL-SCNC: 4.3 MMOL/L — SIGNIFICANT CHANGE UP (ref 3.5–5.3)
RBC # BLD: 3.97 M/UL — LOW (ref 4.2–5.8)
RBC # FLD: 12.1 % — SIGNIFICANT CHANGE UP (ref 10.3–14.5)
SARS-COV-2 IGG+IGM SERPL QL IA: 1.92 INDEX — HIGH
SARS-COV-2 IGG+IGM SERPL QL IA: 53.3 U/ML — HIGH
SARS-COV-2 IGG+IGM SERPL QL IA: POSITIVE
SARS-COV-2 IGG+IGM SERPL QL IA: POSITIVE
SODIUM SERPL-SCNC: 136 MMOL/L — SIGNIFICANT CHANGE UP (ref 135–145)
WBC # BLD: 12.14 K/UL — HIGH (ref 3.8–10.5)
WBC # FLD AUTO: 12.14 K/UL — HIGH (ref 3.8–10.5)

## 2021-11-10 RX ORDER — OXYCODONE HYDROCHLORIDE 5 MG/1
10 TABLET ORAL EVERY 4 HOURS
Refills: 0 | Status: DISCONTINUED | OUTPATIENT
Start: 2021-11-10 | End: 2021-11-13

## 2021-11-10 RX ORDER — PANTOPRAZOLE SODIUM 20 MG/1
40 TABLET, DELAYED RELEASE ORAL
Refills: 0 | Status: DISCONTINUED | OUTPATIENT
Start: 2021-11-10 | End: 2021-11-13

## 2021-11-10 RX ORDER — OXYCODONE HYDROCHLORIDE 5 MG/1
5 TABLET ORAL EVERY 4 HOURS
Refills: 0 | Status: DISCONTINUED | OUTPATIENT
Start: 2021-11-10 | End: 2021-11-13

## 2021-11-10 RX ORDER — POLYETHYLENE GLYCOL 3350 17 G/17G
17 POWDER, FOR SOLUTION ORAL DAILY
Refills: 0 | Status: DISCONTINUED | OUTPATIENT
Start: 2021-11-10 | End: 2021-11-13

## 2021-11-10 RX ORDER — HYDROMORPHONE HYDROCHLORIDE 2 MG/ML
0.5 INJECTION INTRAMUSCULAR; INTRAVENOUS; SUBCUTANEOUS EVERY 4 HOURS
Refills: 0 | Status: DISCONTINUED | OUTPATIENT
Start: 2021-11-10 | End: 2021-11-13

## 2021-11-10 RX ORDER — LANOLIN ALCOHOL/MO/W.PET/CERES
5 CREAM (GRAM) TOPICAL AT BEDTIME
Refills: 0 | Status: DISCONTINUED | OUTPATIENT
Start: 2021-11-10 | End: 2021-11-13

## 2021-11-10 RX ADMIN — Medication 975 MILLIGRAM(S): at 21:52

## 2021-11-10 RX ADMIN — Medication 1 TABLET(S): at 14:05

## 2021-11-10 RX ADMIN — Medication 975 MILLIGRAM(S): at 14:40

## 2021-11-10 RX ADMIN — Medication 2: at 22:44

## 2021-11-10 RX ADMIN — OXYCODONE HYDROCHLORIDE 10 MILLIGRAM(S): 5 TABLET ORAL at 22:52

## 2021-11-10 RX ADMIN — CYCLOBENZAPRINE HYDROCHLORIDE 5 MILLIGRAM(S): 10 TABLET, FILM COATED ORAL at 14:05

## 2021-11-10 RX ADMIN — Medication 975 MILLIGRAM(S): at 14:05

## 2021-11-10 RX ADMIN — Medication 2: at 11:59

## 2021-11-10 RX ADMIN — OXYCODONE HYDROCHLORIDE 10 MILLIGRAM(S): 5 TABLET ORAL at 16:48

## 2021-11-10 RX ADMIN — Medication 100 MILLIGRAM(S): at 05:12

## 2021-11-10 RX ADMIN — Medication 975 MILLIGRAM(S): at 06:12

## 2021-11-10 RX ADMIN — Medication 5 MILLIGRAM(S): at 21:53

## 2021-11-10 RX ADMIN — OXYCODONE HYDROCHLORIDE 10 MILLIGRAM(S): 5 TABLET ORAL at 21:52

## 2021-11-10 RX ADMIN — CYCLOBENZAPRINE HYDROCHLORIDE 5 MILLIGRAM(S): 10 TABLET, FILM COATED ORAL at 21:53

## 2021-11-10 RX ADMIN — CYCLOBENZAPRINE HYDROCHLORIDE 5 MILLIGRAM(S): 10 TABLET, FILM COATED ORAL at 05:12

## 2021-11-10 RX ADMIN — Medication 975 MILLIGRAM(S): at 22:52

## 2021-11-10 RX ADMIN — Medication 2: at 07:15

## 2021-11-10 RX ADMIN — PANTOPRAZOLE SODIUM 40 MILLIGRAM(S): 20 TABLET, DELAYED RELEASE ORAL at 07:14

## 2021-11-10 RX ADMIN — OXYCODONE HYDROCHLORIDE 10 MILLIGRAM(S): 5 TABLET ORAL at 15:48

## 2021-11-10 RX ADMIN — SENNA PLUS 2 TABLET(S): 8.6 TABLET ORAL at 21:53

## 2021-11-10 RX ADMIN — Medication 975 MILLIGRAM(S): at 05:12

## 2021-11-10 NOTE — PHYSICAL THERAPY INITIAL EVALUATION ADULT - PERTINENT HX OF CURRENT PROBLEM, REHAB EVAL
56M with low back pain since 2020 secondary to a work accident, where he fell down stairs/ladder. Since then he reports low back pain that radiates down both legs, with associated numbness, L>R.

## 2021-11-10 NOTE — PROGRESS NOTE ADULT - SUBJECTIVE AND OBJECTIVE BOX
Pain Management Progress Note - [facility] [phone]    HPI: Patient seen and examined today. Patient POD 1 s/p lumbar decompression and fusion L5-S1. Patient reports tolerable level of low back pain today. Patient reports improvement of pain with dilaudid PCA. Patient reports having hiccups, but denies other side effects from current pain regimen. Discussed with patient plan to D/C Dilaudid PCA and transition to PO pain regimen, patient questions were answered and patient verbalized understanding.   reference ID: [id number]    Pertinent PMH: Pain at: __X_Back ___Neck___Knee ___Hip ___Shoulder ___ Opioid tolerance    Pain is X___ sharp ____dull ___burning ___achy ___ Intensity: ____ mild X____mod ___X_severe     Location ____X_surgical site _____cervical __X___lumbar ____abd _____upper ext____lower ext    Worse with _X___activity _X___movement _____physical therapy___ Rest    Improved with __X__medication ____rest ____physical therapy    PMH:  HYDROmorphone  Injectable  oxyCODONE    IR  oxyCODONE    IR  pantoprazole    Tablet  insulin lispro (ADMELOG) corrective regimen sliding scale  ceFAZolin   IVPB  ceFAZolin   IVPB  ondansetron Injectable  naloxone Injectable  HYDROmorphone PCA (1 mG/mL) Rescue [facility] Bolus  HYDROmorphone PCA (1 mG/mL)  acetaminophen     Tablet ..  cyclobenzaprine  glucagon  Injectable  dextrose 5%.  dextrose 50% Injectable  dextrose 50% Injectable  dextrose 50% Injectable  dextrose 5%.  dextrose 40% Gel  insulin lispro (ADMELOG) corrective regimen sliding scale  multivitamin  senna  ondansetron Injectable  HYDROmorphone  Injectable  acetaminophen     Tablet ..  lactated ringers.  gabapentin  acetaminophen     Tablet ..  aprepitant  chlorhexidine 2% Cloths  povidone iodine 5% Nasal Swab      ROS: Const:  N___febrile   Eyes:___ENT:___CV: __N_chest pain  Resp: __N__sob  GI:_N__nausea _N__vomiting __N__abd pain ___npo ___clears Y___full diet __bm  :___ Musk: __Y_pain ___spasm  Skin:___ Neuro:  N_sedation___confusion____N numbness _N__weakness __N_paresthesia  Psych:___anxiety  Endo:___ Heme:___Allergy:__NKDA_      11-10 @ 07:53971 mL/min/1.73M2  11-09 @ 14:33978 mL/min/1.73M2  Hemoglobin: 12.7 g/dL (11-10 @ 07:31)  T(C): 36.6 (11-10-21 @ 09:11), Max: 36.8 (11-09-21 @ 16:04)  HR: 75 (11-10-21 @ 09:11) (66 - 85)  BP: 116/72 (11-10-21 @ 09:11) (100/62 - 126/72)  RR: 17 (11-10-21 @ 09:11) (13 - 18)  SpO2: 98% (11-10-21 @ 09:11) (94% - 100%)  Wt(kg): --     PHYSICAL EXAM:  Gen Appearance: __X_no acute distress __X_appropriate       Neuro: __X_SILT feet____ EOM Intact Psych: AAOX_3_, __X_mood/affect appropriate        Eyes: X___conjunctiva WNL  ___X__ Pupils equal and round        ENT: __X_ears and nose atraumatic__X_ Hearing grossly intact        Neck: __X_trachea midline, no visible masses ___thyroid without palpable mass    Resp: __X_Nml WOB____No tactile fremitus ___clear to auscultation    Cardio: _X__extremities free from edema ____pedal pulses palpable    GI/Abdomen: ___soft _____ Nontender____X__Nondistended_____HSM    Lymphatic: ___no palpable nodes in neck  ___no palpable nodes calves and feet    Skin/Wound: ___Incision, _X__Dressing c/d/i,   ____surrounding tissues soft,  _X__drain/chest tube present____    Muscular: EHL ___/5  Gastrocnemius___/5    __X_absent clubbing/cyanosis         ASSESSMENT:  This is a 56y old Male with a history of DM POD 1 s/p lumbar decompression and fusion L5-S1 with Dr. Dale, doing well.    Recommended Treatment PLAN:  1. D/C Dilaudid PCA  2. Start Oxycodone 5-10 mg PO Q4h PRN moderate-severe pain  3. Start Dilaudid 0.5 mg IVP Q4h PRN breakthrough pain  4. Tylenol 975 mg PO TID  5. Flexeril 5 mg PO TID  Plan discussed with Dr. Hidalgo     Pain Management Progress Note - [facility] [phone]    HPI: Patient seen and examined today. Patient POD 1 s/p lumbar decompression and fusion L5-S1. Patient reports tolerable level of low back pain today. Patient reports improvement of pain with dilaudid PCA. Patient reports having hiccups, but denies other side effects from current pain regimen. Discussed with patient plan to D/C Dilaudid PCA and transition to PO pain regimen, patient questions were answered and patient verbalized understanding.   reference ID: [id number]    Pertinent PMH: Pain at: __X_Back ___Neck___Knee ___Hip ___Shoulder ___ Opioid tolerance    Pain is X___ sharp ____dull ___burning ___achy ___ Intensity: ____ mild X____mod ___X_severe     Location ____X_surgical site _____cervical __X___lumbar ____abd _____upper ext____lower ext    Worse with _X___activity _X___movement _____physical therapy___ Rest    Improved with __X__medication ____rest ____physical therapy    PMH:  Lumbar radiculopathy  DM (diabetes mellitus)  Back pain  fall at work in 2020, back injury  No significant past surgical history    Medications:  HYDROmorphone  Injectable  oxyCODONE    IR  oxyCODONE    IR  pantoprazole    Tablet  insulin lispro (ADMELOG) corrective regimen sliding scale  ceFAZolin   IVPB  ceFAZolin   IVPB  ondansetron Injectable  naloxone Injectable  HYDROmorphone PCA (1 mG/mL) Rescue [facility]ician Bolus  HYDROmorphone PCA (1 mG/mL)  acetaminophen     Tablet ..  cyclobenzaprine  glucagon  Injectable  dextrose 5%.  dextrose 50% Injectable  dextrose 50% Injectable  dextrose 50% Injectable  dextrose 5%.  dextrose 40% Gel  insulin lispro (ADMELOG) corrective regimen sliding scale  multivitamin  senna  ondansetron Injectable  HYDROmorphone  Injectable  acetaminophen     Tablet ..  lactated ringers.  gabapentin  acetaminophen     Tablet ..  aprepitant  chlorhexidine 2% Cloths  povidone iodine 5% Nasal Swab    ROS: Const:  N___febrile   Eyes:___ENT:___CV: __N_chest pain  Resp: __N__sob  GI:_N__nausea _N__vomiting __N__abd pain ___npo ___clears Y___full diet __bm  :___ Musk: __Y_pain ___spasm  Skin:___ Neuro:  N_sedation___confusion____N numbness _N__weakness __N_paresthesia  Psych:___anxiety  Endo:___ Heme:___Allergy:__NKDA_      11-10 @ 07:74819 mL/min/1.73M2  11-09 @ 14:66377 mL/min/1.73M2  Hemoglobin: 12.7 g/dL (11-10 @ 07:31)  T(C): 36.6 (11-10-21 @ 09:11), Max: 36.8 (11-09-21 @ 16:04)  HR: 75 (11-10-21 @ 09:11) (66 - 85)  BP: 116/72 (11-10-21 @ 09:11) (100/62 - 126/72)  RR: 17 (11-10-21 @ 09:11) (13 - 18)  SpO2: 98% (11-10-21 @ 09:11) (94% - 100%)  Wt(kg): --     PHYSICAL EXAM:  Gen Appearance: __X_no acute distress __X_appropriate       Neuro: __X_SILT feet____ EOM Intact Psych: AAOX_3_, __X_mood/affect appropriate        Eyes: X___conjunctiva WNL  ___X__ Pupils equal and round        ENT: __X_ears and nose atraumatic__X_ Hearing grossly intact        Neck: __X_trachea midline, no visible masses ___thyroid without palpable mass    Resp: __X_Nml WOB____No tactile fremitus ___clear to auscultation    Cardio: _X__extremities free from edema ____pedal pulses palpable    GI/Abdomen: ___soft _____ Nontender____X__Nondistended_____HSM    Lymphatic: ___no palpable nodes in neck  ___no palpable nodes calves and feet    Skin/Wound: ___Incision, _X__Dressing c/d/i,   ____surrounding tissues soft,  _X__drain/chest tube present____    Muscular: EHL ___/5  Gastrocnemius___/5    __X_absent clubbing/cyanosis         ASSESSMENT:  This is a 56y old Male with a history of DM POD 1 s/p lumbar decompression and fusion L5-S1 with Dr. Dale, doing well.    Recommended Treatment PLAN:  1. D/C Dilaudid PCA  2. Start Oxycodone 5-10 mg PO Q4h PRN moderate-severe pain  3. Start Dilaudid 0.5 mg IVP Q4h PRN breakthrough pain  4. Tylenol 975 mg PO TID  5. Flexeril 5 mg PO TID  Plan discussed with Dr. Hidalgo

## 2021-11-10 NOTE — PHYSICAL THERAPY INITIAL EVALUATION ADULT - TRANSFER SKILLS, REHAB EVAL
Take glipizide 10 mg twice a day  Stop Jardiance   Once you start taking trulicity stop Januvia   Do blood work as suggested      Hypoglycemia instructions   Jose Miguel Alonsoleonard  11/6/2018  [id number]    Low Blood Sugar    Steps to treat low blood sugar  1  Test blood sugar if you have symptoms of low blood sugar:   Low Blood Sugar Symptoms:  o Sweaty  o Dizzy  o Rapid heartbeat  o Shaky    o Bad mood  o Hungry      2  Treat blood sugar less than 70 with 15 grams of fast-acting carbohydrate:   Examples of 15 grams Fast-Acting Carbohydrate:  o 4 oz juice  o 4 oz regular soda  o 3-4 glucose tablets (chew)  o 3-4 hard candies (chew)              3    Wait 15 minutes and test your blood sugar again           4   If blood sugar is less than 100, repeat steps 2-3       5  When your blood sugar is 100 or more, eat a snack if it will be longer than one hour until your next meal  The snack should be 15 grams of carbohydrate and a protein:   Examples of snacks:  o ½ sandwich  o 6 crackers with cheese  o Piece of fruit with cheese or peanut butter  o 6 crackers with peanut butter needs device

## 2021-11-10 NOTE — PROGRESS NOTE ADULT - SUBJECTIVE AND OBJECTIVE BOX
POST OPERATIVE DAY #: 1  STATUS POST: PSF LAMI L5-S1                      SUBJECTIVE: Patient seen and examined. Pt. states he feels better since he was very hungry from yesterday. Pt. c/o pain in his low back.   Denies any sob/cp/n/v/numbness or tingling in b/l les.     OBJECTIVE:     Vital Signs Last 24 Hrs  T(C): 36.6 (10 Nov 2021 09:11), Max: 36.8 (09 Nov 2021 16:04)  T(F): 97.9 (10 Nov 2021 09:11), Max: 98.3 (09 Nov 2021 16:04)  HR: 75 (10 Nov 2021 09:11) (66 - 85)  BP: 116/72 (10 Nov 2021 09:11) (100/62 - 126/72)  BP(mean): 94 (09 Nov 2021 14:15) (76 - 95)  RR: 17 (10 Nov 2021 09:11) (13 - 18)  SpO2: 98% (10 Nov 2021 09:11) (94% - 100%)    Affected extremity: bilateral lower extremities skin intact, no erythema/ecchymosis/sts         Dressing: saturated with blood, 1 drain         Sensation: intact to light touch to patient's baseline         Motor exam: EHL/TA/GS 5/5  Pulses 2+             I&O's Detail    09 Nov 2021 07:01  -  10 Nov 2021 07:00  --------------------------------------------------------  IN:  Total IN: 0 mL    OUT:    Accordian (mL): 140 mL    Voided (mL): 1950 mL  Total OUT: 2090 mL    Total NET: -2090 mL      10 Nov 2021 07:01  -  10 Nov 2021 10:40  --------------------------------------------------------  IN:    Oral Fluid: 320 mL  Total IN: 320 mL    OUT:    Voided (mL): 850 mL  Total OUT: 850 mL    Total NET: -530 mL          LABS:                        12.7   12.14 )-----------( 211      ( 10 Nov 2021 07:31 )             37.8     11-10    136  |  103  |  10  ----------------------------<  186<H>  4.3   |  24  |  0.70    Ca    9.1      10 Nov 2021 07:31            MEDICATIONS:    acetaminophen     Tablet .. 975 milliGRAM(s) Oral every 8 hours  cyclobenzaprine 5 milliGRAM(s) Oral three times a day  HYDROmorphone  Injectable 0.5 milliGRAM(s) IV Push every 15 minutes PRN  HYDROmorphone  Injectable 0.5 milliGRAM(s) IV Push every 4 hours PRN  ondansetron Injectable 4 milliGRAM(s) IV Push every 6 hours PRN  oxyCODONE    IR 5 milliGRAM(s) Oral every 4 hours PRN  oxyCODONE    IR 10 milliGRAM(s) Oral every 4 hours PRN          ASSESSMENT AND PLAN: 55yo Male s/p PSF LAMI L5-S1    1. Analgesic pain control  2. DVT prophylaxis:     SCDs      3. Weight Bearing Status:  Weight bearing as tolerated     4. Disposition: PENDING PT eval   5. Dressing changed today using aseptic technique. Will continue drain today 140cc.

## 2021-11-10 NOTE — PHYSICAL THERAPY INITIAL EVALUATION ADULT - GENERAL OBSERVATIONS, REHAB EVAL
Pt received semi supine, +hemovac, +lumbar incision bandage changed at bedside with SAMIR Sommer, +heplock, NAD, agreeable to PT.

## 2021-11-10 NOTE — PROGRESS NOTE ADULT - SUBJECTIVE AND OBJECTIVE BOX
POST OPERATIVE DAY #: 1  STATUS POST: PSF LAMI L5-S1                      SUBJECTIVE: Patient seen and examined. Pt. states he feels better since he was very hungry from yesterday. Pt. c/o pain in his low back.   Denies any sob/cp/n/v/numbness or tingling in b/l les.     OBJECTIVE:     Vital Signs Last 24 Hrs  T(C): 36.6 (10 Nov 2021 09:11), Max: 36.8 (09 Nov 2021 16:04)  T(F): 97.9 (10 Nov 2021 09:11), Max: 98.3 (09 Nov 2021 16:04)  HR: 75 (10 Nov 2021 09:11) (66 - 85)  BP: 116/72 (10 Nov 2021 09:11) (100/62 - 126/72)  BP(mean): 94 (09 Nov 2021 14:15) (76 - 95)  RR: 17 (10 Nov 2021 09:11) (13 - 18)  SpO2: 98% (10 Nov 2021 09:11) (94% - 100%)    Affected extremity: bilateral lower extremities skin intact, no erythema/ecchymosis/sts         Dressing: saturated with blood, 1 drain         Sensation: intact to light touch to patient's baseline         Motor exam: EHL/TA/GS 5/5  Pulses 2+  HEENT neg  COR RRR  LUNGS clear bilat anteriorly  Abd Soft BS present             I&O's Detail    09 Nov 2021 07:01  -  10 Nov 2021 07:00  --------------------------------------------------------  IN:  Total IN: 0 mL    OUT:    Accordian (mL): 140 mL    Voided (mL): 1950 mL  Total OUT: 2090 mL    Total NET: -2090 mL      10 Nov 2021 07:01  -  10 Nov 2021 10:40  --------------------------------------------------------  IN:    Oral Fluid: 320 mL  Total IN: 320 mL    OUT:    Voided (mL): 850 mL  Total OUT: 850 mL    Total NET: -530 mL          LABS:                        12.7   12.14 )-----------( 211      ( 10 Nov 2021 07:31 )             37.8     11-10    136  |  103  |  10  ----------------------------<  186<H>  4.3   |  24  |  0.70    Ca    9.1      10 Nov 2021 07:31            MEDICATIONS:    acetaminophen     Tablet .. 975 milliGRAM(s) Oral every 8 hours  cyclobenzaprine 5 milliGRAM(s) Oral three times a day  HYDROmorphone  Injectable 0.5 milliGRAM(s) IV Push every 15 minutes PRN  HYDROmorphone  Injectable 0.5 milliGRAM(s) IV Push every 4 hours PRN  ondansetron Injectable 4 milliGRAM(s) IV Push every 6 hours PRN  oxyCODONE    IR 5 milliGRAM(s) Oral every 4 hours PRN  oxyCODONE    IR 10 milliGRAM(s) Oral every 4 hours PRN          ASSESSMENT AND PLAN: 57yo Male s/p PSF LAMI L5-S1

## 2021-11-11 LAB
ANION GAP SERPL CALC-SCNC: 7 MMOL/L — SIGNIFICANT CHANGE UP (ref 5–17)
BASOPHILS # BLD AUTO: 0.02 K/UL — SIGNIFICANT CHANGE UP (ref 0–0.2)
BASOPHILS NFR BLD AUTO: 0.2 % — SIGNIFICANT CHANGE UP (ref 0–2)
BUN SERPL-MCNC: 10 MG/DL — SIGNIFICANT CHANGE UP (ref 7–23)
CALCIUM SERPL-MCNC: 8.7 MG/DL — SIGNIFICANT CHANGE UP (ref 8.4–10.5)
CHLORIDE SERPL-SCNC: 103 MMOL/L — SIGNIFICANT CHANGE UP (ref 96–108)
CO2 SERPL-SCNC: 27 MMOL/L — SIGNIFICANT CHANGE UP (ref 22–31)
CREAT SERPL-MCNC: 0.77 MG/DL — SIGNIFICANT CHANGE UP (ref 0.5–1.3)
EOSINOPHIL # BLD AUTO: 0.03 K/UL — SIGNIFICANT CHANGE UP (ref 0–0.5)
EOSINOPHIL NFR BLD AUTO: 0.4 % — SIGNIFICANT CHANGE UP (ref 0–6)
GLUCOSE BLDC GLUCOMTR-MCNC: 115 MG/DL — HIGH (ref 70–99)
GLUCOSE BLDC GLUCOMTR-MCNC: 124 MG/DL — HIGH (ref 70–99)
GLUCOSE BLDC GLUCOMTR-MCNC: 134 MG/DL — HIGH (ref 70–99)
GLUCOSE BLDC GLUCOMTR-MCNC: 156 MG/DL — HIGH (ref 70–99)
GLUCOSE SERPL-MCNC: 136 MG/DL — HIGH (ref 70–99)
HCT VFR BLD CALC: 36.2 % — LOW (ref 39–50)
HGB BLD-MCNC: 12.1 G/DL — LOW (ref 13–17)
IMM GRANULOCYTES NFR BLD AUTO: 0.4 % — SIGNIFICANT CHANGE UP (ref 0–1.5)
LYMPHOCYTES # BLD AUTO: 1.92 K/UL — SIGNIFICANT CHANGE UP (ref 1–3.3)
LYMPHOCYTES # BLD AUTO: 23.2 % — SIGNIFICANT CHANGE UP (ref 13–44)
MCHC RBC-ENTMCNC: 31.3 PG — SIGNIFICANT CHANGE UP (ref 27–34)
MCHC RBC-ENTMCNC: 33.4 GM/DL — SIGNIFICANT CHANGE UP (ref 32–36)
MCV RBC AUTO: 93.8 FL — SIGNIFICANT CHANGE UP (ref 80–100)
MONOCYTES # BLD AUTO: 1.09 K/UL — HIGH (ref 0–0.9)
MONOCYTES NFR BLD AUTO: 13.2 % — SIGNIFICANT CHANGE UP (ref 2–14)
NEUTROPHILS # BLD AUTO: 5.19 K/UL — SIGNIFICANT CHANGE UP (ref 1.8–7.4)
NEUTROPHILS NFR BLD AUTO: 62.6 % — SIGNIFICANT CHANGE UP (ref 43–77)
NRBC # BLD: 0 /100 WBCS — SIGNIFICANT CHANGE UP (ref 0–0)
PLATELET # BLD AUTO: 191 K/UL — SIGNIFICANT CHANGE UP (ref 150–400)
POTASSIUM SERPL-MCNC: 4 MMOL/L — SIGNIFICANT CHANGE UP (ref 3.5–5.3)
POTASSIUM SERPL-SCNC: 4 MMOL/L — SIGNIFICANT CHANGE UP (ref 3.5–5.3)
RBC # BLD: 3.86 M/UL — LOW (ref 4.2–5.8)
RBC # FLD: 12.4 % — SIGNIFICANT CHANGE UP (ref 10.3–14.5)
SODIUM SERPL-SCNC: 137 MMOL/L — SIGNIFICANT CHANGE UP (ref 135–145)
WBC # BLD: 8.28 K/UL — SIGNIFICANT CHANGE UP (ref 3.8–10.5)
WBC # FLD AUTO: 8.28 K/UL — SIGNIFICANT CHANGE UP (ref 3.8–10.5)

## 2021-11-11 PROCEDURE — 72100 X-RAY EXAM L-S SPINE 2/3 VWS: CPT | Mod: 26

## 2021-11-11 RX ORDER — PANTOPRAZOLE SODIUM 20 MG/1
1 TABLET, DELAYED RELEASE ORAL
Qty: 0 | Refills: 0 | DISCHARGE
Start: 2021-11-11

## 2021-11-11 RX ORDER — CYCLOBENZAPRINE HYDROCHLORIDE 10 MG/1
1 TABLET, FILM COATED ORAL
Qty: 0 | Refills: 0 | DISCHARGE
Start: 2021-11-11

## 2021-11-11 RX ORDER — ACETAMINOPHEN 500 MG
3 TABLET ORAL
Qty: 0 | Refills: 0 | DISCHARGE
Start: 2021-11-11

## 2021-11-11 RX ORDER — IBUPROFEN 200 MG
1 TABLET ORAL
Qty: 0 | Refills: 0 | DISCHARGE

## 2021-11-11 RX ORDER — SENNA PLUS 8.6 MG/1
2 TABLET ORAL
Qty: 0 | Refills: 0 | DISCHARGE
Start: 2021-11-11

## 2021-11-11 RX ORDER — OXYCODONE HYDROCHLORIDE 5 MG/1
1 TABLET ORAL
Qty: 0 | Refills: 0 | DISCHARGE
Start: 2021-11-11

## 2021-11-11 RX ORDER — LANOLIN ALCOHOL/MO/W.PET/CERES
1 CREAM (GRAM) TOPICAL
Qty: 0 | Refills: 0 | DISCHARGE
Start: 2021-11-11

## 2021-11-11 RX ADMIN — OXYCODONE HYDROCHLORIDE 10 MILLIGRAM(S): 5 TABLET ORAL at 10:20

## 2021-11-11 RX ADMIN — SENNA PLUS 2 TABLET(S): 8.6 TABLET ORAL at 21:03

## 2021-11-11 RX ADMIN — OXYCODONE HYDROCHLORIDE 10 MILLIGRAM(S): 5 TABLET ORAL at 09:20

## 2021-11-11 RX ADMIN — Medication 975 MILLIGRAM(S): at 21:03

## 2021-11-11 RX ADMIN — Medication 975 MILLIGRAM(S): at 06:31

## 2021-11-11 RX ADMIN — Medication 975 MILLIGRAM(S): at 14:54

## 2021-11-11 RX ADMIN — Medication 1 TABLET(S): at 13:53

## 2021-11-11 RX ADMIN — PANTOPRAZOLE SODIUM 40 MILLIGRAM(S): 20 TABLET, DELAYED RELEASE ORAL at 05:31

## 2021-11-11 RX ADMIN — CYCLOBENZAPRINE HYDROCHLORIDE 5 MILLIGRAM(S): 10 TABLET, FILM COATED ORAL at 05:31

## 2021-11-11 RX ADMIN — CYCLOBENZAPRINE HYDROCHLORIDE 5 MILLIGRAM(S): 10 TABLET, FILM COATED ORAL at 21:03

## 2021-11-11 RX ADMIN — Medication 975 MILLIGRAM(S): at 22:00

## 2021-11-11 RX ADMIN — POLYETHYLENE GLYCOL 3350 17 GRAM(S): 17 POWDER, FOR SOLUTION ORAL at 13:53

## 2021-11-11 RX ADMIN — CYCLOBENZAPRINE HYDROCHLORIDE 5 MILLIGRAM(S): 10 TABLET, FILM COATED ORAL at 13:53

## 2021-11-11 RX ADMIN — OXYCODONE HYDROCHLORIDE 10 MILLIGRAM(S): 5 TABLET ORAL at 05:35

## 2021-11-11 RX ADMIN — Medication 2: at 12:06

## 2021-11-11 RX ADMIN — OXYCODONE HYDROCHLORIDE 10 MILLIGRAM(S): 5 TABLET ORAL at 04:35

## 2021-11-11 RX ADMIN — Medication 975 MILLIGRAM(S): at 13:54

## 2021-11-11 RX ADMIN — Medication 975 MILLIGRAM(S): at 05:31

## 2021-11-11 NOTE — PROGRESS NOTE ADULT - SUBJECTIVE AND OBJECTIVE BOX
POST OPERATIVE DAY #: 2  STATUS POST: PSF LAMI L5-S1                      SUBJECTIVE: Patient seen and examined. Pt. states he feels better since he was very hungry from yesterday. Pt. c/o pain in his low back.   Denies any sob/cp/n/v/numbness or tingling in b/l les.     OBJECTIVE:     Vital Signs Last 24 Hrs  Vital Signs Last 24 Hrs  T(C): 37.2 (11 Nov 2021 20:33), Max: 37.2 (11 Nov 2021 20:33)  T(F): 99 (11 Nov 2021 20:33), Max: 99 (11 Nov 2021 20:33)  HR: 95 (11 Nov 2021 20:33) (72 - 95)  BP: 115/72 (11 Nov 2021 20:33) (108/64 - 129/72)  BP(mean): --  RR: 18 (11 Nov 2021 20:33) (15 - 18)  SpO2: 96% (11 Nov 2021 20:33) (96% - 99%)    Affected extremity: bilateral lower extremities skin intact, no erythema/ecchymosis/sts         Dressing: saturated with blood, 1 drain         Sensation: intact to light touch to patient's baseline         Motor exam: EHL/TA/GS 5/5  Pulses 2+  HEENT neg  COR RRR  LUNGS clear bilat anteriorly  Abd Soft BS present             I&O's Detail    09 Nov 2021 07:01  -  10 Nov 2021 07:00  --------------------------------------------------------  IN:  Total IN: 0 mL    OUT:    Accordian (mL): 140 mL    Voided (mL): 1950 mL  Total OUT: 2090 mL    Total NET: -2090 mL      10 Nov 2021 07:01  -  10 Nov 2021 10:40  --------------------------------------------------------  IN:    Oral Fluid: 320 mL  Total IN: 320 mL    OUT:    Voided (mL): 850 mL  Total OUT: 850 mL    Total NET: -530 mL          LABS:                        12.7   12.14 )-----------( 211      ( 10 Nov 2021 07:31 )             37.8                         12.1   8.28  )-----------( 191      ( 11 Nov 2021 07:12 )             36.2     11-10    136  |  103  |  10  ----------------------------<  186<H>  4.3   |  24  |  0.70    Ca    9.1      10 Nov 2021 07:31    11-11    137  |  103  |  10  ----------------------------<  136<H>  4.0   |  27  |  0.77    Ca    8.7      11 Nov 2021 07:12            MEDICATIONS:    acetaminophen     Tablet .. 975 milliGRAM(s) Oral every 8 hours  cyclobenzaprine 5 milliGRAM(s) Oral three times a day  HYDROmorphone  Injectable 0.5 milliGRAM(s) IV Push every 15 minutes PRN  HYDROmorphone  Injectable 0.5 milliGRAM(s) IV Push every 4 hours PRN  ondansetron Injectable 4 milliGRAM(s) IV Push every 6 hours PRN  oxyCODONE    IR 5 milliGRAM(s) Oral every 4 hours PRN  oxyCODONE    IR 10 milliGRAM(s) Oral every 4 hours PRN          ASSESSMENT AND PLAN: 55yo Male s/p PSF LAMI L5-S1

## 2021-11-11 NOTE — PROGRESS NOTE ADULT - SUBJECTIVE AND OBJECTIVE BOX
Pt comfortable without complaints, pain controlled with current pain medication regimen.   Denies CP, SOB, N/V, numbness/tingling     Objective:    Physical Exam:  General: Pt Alert and oriented, NAD   dressing c,d,i, x1 hv drain   Pulses: +2 pedal pulses,  Sensation: silt intact  Motor: EHL/FHL/TA/GS- firing    Plan of Care:    - afebrile, nontoxic appearance  - Pain Control-     - DVT ppx: scds  - PT, WBS: WBAT  - bowel regimen, IS use   - appreciate Pain Management Recs  - Dispo- to home pending Pt eval

## 2021-11-11 NOTE — PROGRESS NOTE ADULT - SUBJECTIVE AND OBJECTIVE BOX
Ortho Note    Subjective:  Pt comfortable without complaints, pain controlled with current pain medication regimen.   Denies CP, SOB, N/V, numbness/tingling       Vital Signs Last 24 Hrs  T(C): 36.9 (11-11-21 @ 08:30), Max: 36.9 (11-11-21 @ 05:31)  T(F): 98.4 (11-11-21 @ 08:30), Max: 98.5 (11-11-21 @ 05:31)  HR: 72 (11-11-21 @ 08:30) (72 - 74)  BP: 108/64 (11-11-21 @ 08:30) (108/64 - 111/69)  BP(mean): --  RR: 15 (11-11-21 @ 08:30) (15 - 18)  SpO2: 98% (11-11-21 @ 08:30) (97% - 98%)  AVSS    Objective:    Physical Exam:  General: Pt Alert and oriented, NAD  lumbar DSG C/D/I. x1hv   Pulses: +2 pedal pulses, wwp toes, cap refill less than 3 seconds  Sensation: silt intact  Motor: EHL/FHL/TA/GS- firing        Plan of Care:  A/P: 56yMale POD#2 s/p L5-S1 decompression and fusion  - afebrile, nontoxic apperance  - Pain Control- tylenol 975mg PO TID, Dilaudid 0.5mg Q4h prn breakthrough pain, Flexeril 5mg PO TID, oxycodone 5-10mg PO Q4h prn moderate to severe pain   - DVT ppx: scds  - PT, WBS: WBAT  - bowel regimen, IS use  - Dispo- home pending pt clearance    Ortho Pager [phone] Ortho Note    Subjective:  Pt comfortable without complaints, pain controlled with current pain medication regimen.   Denies CP, SOB, N/V, numbness/tingling       Vital Signs Last 24 Hrs  T(C): 36.9 (11-11-21 @ 08:30), Max: 36.9 (11-11-21 @ 05:31)  T(F): 98.4 (11-11-21 @ 08:30), Max: 98.5 (11-11-21 @ 05:31)  HR: 72 (11-11-21 @ 08:30) (72 - 74)  BP: 108/64 (11-11-21 @ 08:30) (108/64 - 111/69)  BP(mean): --  RR: 15 (11-11-21 @ 08:30) (15 - 18)  SpO2: 98% (11-11-21 @ 08:30) (97% - 98%)  AVSS    Objective:    Physical Exam:  General: Pt Alert and oriented, NAD  lumbar DSG C/D/I. x1hv   Pulses: +2 pedal pulses, wwp toes, cap refill less than 3 seconds  Sensation: silt intact  Motor: EHL/FHL/TA/GS- firing        Plan of Care:  A/P: 56yMale POD#2 s/p L5-S1 decompression and fusion  - afebrile, nontoxic appearance  - Pain Control- tylenol 975mg PO TID, Dilaudid 0.5mg Q4h prn breakthrough pain, Flexeril 5mg PO TID, oxycodone 5-10mg PO Q4h prn moderate to severe pain   - DVT ppx: scds  - PT, WBS: WBAT  - bowel regimen, IS use  - monitor drain output  - Dispo- home pending pt clearance and drain d/c    Ortho Pager [phone]

## 2021-11-11 NOTE — PROGRESS NOTE ADULT - SUBJECTIVE AND OBJECTIVE BOX
Pain Management Progress Note - [facility] [phone]    HPI: Patient seen and examined today. Patient reports endorsing moderate low back pain. Patient reports back pain is improved with PO Oxycodone. Patient reports endorsing constipation, but denies other side effects from current pain regimen. Patient reports adequate pain control with current pain regimen.    Pertinent PMH: Pain at: __X_Back ___Neck___Knee ___Hip ___Shoulder ___ Opioid tolerance    Pain is __X_ sharp ____dull ___burning ___achy ___ Intensity: ____ mild __X__mod __X__severe     Location ____X_surgical site _____cervical ____X_lumbar ____abd _____upper ext____lower ext    Worse with __X__activity __X__movement _____physical therapy___ Rest    Improved with ___X_medication __X__rest ____physical therapy    PMH:  HYDROmorphone  Injectable  oxyCODONE    IR  oxyCODONE    IR  pantoprazole    Tablet  insulin lispro (ADMELOG) corrective regimen sliding scale  ceFAZolin   IVPB  ceFAZolin   IVPB  ondansetron Injectable  naloxone Injectable  HYDROmorphone PCA (1 mG/mL) [facility] Bolus  HYDROmorphone PCA (1 mG/mL)  acetaminophen     Tablet ..  cyclobenzaprine  glucagon  Injectable  dextrose 5%.  dextrose 50% Injectable  dextrose 50% Injectable  dextrose 50% Injectable  dextrose 5%.  dextrose 40% Gel  insulin lispro (ADMELOG) corrective regimen sliding scale  multivitamin  senna  ondansetron Injectable  HYDROmorphone  Injectable  acetaminophen     Tablet ..  lactated ringers.  gabapentin  acetaminophen     Tablet ..  aprepitant  chlorhexidine 2% Cloths  povidone iodine 5% Nasal Swab    Medications:  melatonin  polyethylene glycol 3350  HYDROmorphone  Injectable  oxyCODONE    IR  oxyCODONE    IR  pantoprazole    Tablet  insulin lispro (ADMELOG) corrective regimen sliding scale  ceFAZolin   IVPB  ceFAZolin   IVPB  ondansetron Injectable  naloxone Injectable  HYDROmorphone PCA (1 mG/mL) [facility] Bolus  HYDROmorphone PCA (1 mG/mL)  acetaminophen     Tablet ..  cyclobenzaprine  glucagon  Injectable  dextrose 5%.  dextrose 50% Injectable  dextrose 50% Injectable  dextrose 50% Injectable  dextrose 5%.  dextrose 40% Gel  insulin lispro (ADMELOG) corrective regimen sliding scale  multivitamin  senna  ondansetron Injectable  HYDROmorphone  Injectable  acetaminophen     Tablet ..  lactated ringers.  gabapentin  acetaminophen     Tablet ..  aprepitant  chlorhexidine 2% Cloths  povidone iodine 5% Nasal Swab    ROS: Const:  __N_febrile   Eyes:___ENT:___CV: _N__chest pain  Resp: _N___sob  GI:__N_nausea __N_vomiting ____abd pain ___npo ___clears __Y_full diet __bm  :___ Musk: _Y__pain ___spasm  Skin:___ Neuro:  __N_sedation_N__confusion___N_ numbness __N_weakness ___paresthesia  Psych:___anxiety  Endo:___ Heme:___Allergy:_NKDA__      11-11 @ 07:41665 mL/min/1.73M2  Hemoglobin: 12.1 g/dL (11-11 @ 07:12)  Hemoglobin: 12.7 g/dL (11-10 @ 07:31)  T(C): 36.9 (11-11-21 @ 08:30), Max: 36.9 (11-10-21 @ 16:31)  HR: 72 (11-11-21 @ 08:30) (72 - 85)  BP: 108/64 (11-11-21 @ 08:30) (104/62 - 111/69)  RR: 15 (11-11-21 @ 08:30) (15 - 18)  SpO2: 98% (11-11-21 @ 08:30) (97% - 99%)  Wt(kg): --     PHYSICAL EXAM:  Gen Appearance: X___no acute distress __X_appropriate       Neuro: ___SILT feet____ EOM Intact Psych: AAOX_3_, X___mood/affect appropriate        Eyes: _X__conjunctiva WNL  __X___ Pupils equal and round        ENT: _X__ears and nose atraumatic__X_ Hearing grossly intact        Neck: _X__trachea midline, no visible masses ___thyroid without palpable mass    Resp: __X_Nml WOB____No tactile fremitus ___clear to auscultation    Cardio: _X__extremities free from edema ____pedal pulses palpable    GI/Abdomen: ___soft _____ Nontender____X__Nondistended_____HSM    Lymphatic: ___no palpable nodes in neck  ___no palpable nodes calves and feet    Skin/Wound: ___Incision, _X__Dressing c/d/i,   ____surrounding tissues soft,  ___drain/chest tube present____    Muscular: EHL ___/5  Gastrocnemius___/5    _X__absent clubbing/cyanosis         ASSESSMENT:  This is a 56y old Male with a history of  DM POD 2 s/p lumbar decompression and fusion L5-S1 with Dr. Dale, doing well.    Recommended Treatment PLAN:  1. Oxycodone 5-10 mg PO Q4h PRN moderate-severe pain  2. Dilaudid 0.5 mg IVP Q4h PRN breakthrough pain  3. Tylenol 975 mg PO TID  4. Flexeril 5 mg PO TID  Plan discussed with Dr. Hidalgo     Pain Management Progress Note - [facility] [phone]    HPI: Patient seen and examined today. Patient reports endorsing moderate low back pain. Patient reports back pain is improved with PO Oxycodone. Patient reports endorsing constipation, but denies other side effects from current pain regimen. Patient reports adequate pain control with current pain regimen.    Pertinent PMH: Pain at: __X_Back ___Neck___Knee ___Hip ___Shoulder ___ Opioid tolerance    Pain is __X_ sharp ____dull ___burning ___achy ___ Intensity: ____ mild __X__mod __X__severe     Location ____X_surgical site _____cervical ____X_lumbar ____abd _____upper ext____lower ext    Worse with __X__activity __X__movement _____physical therapy___ Rest    Improved with ___X_medication __X__rest ____physical therapy    PMH:  HYDROmorphone  Injectable  oxyCODONE    IR  oxyCODONE    IR  pantoprazole    Tablet  insulin lispro (ADMELOG) corrective regimen sliding scale  ceFAZolin   IVPB  ceFAZolin   IVPB  ondansetron Injectable  naloxone Injectable  HYDROmorphone PCA (1 mG/mL) Rescue [facility] Bol[facility]  HYDROmorphone PCA (1 mG/mL)  acetaminophen     Tablet ..  cyclobenzaprine  glucagon  Injectable  dextrose 5%.  dextrose 50% Injectable  dextrose 50% Injectable  dextrose 50% Injectable  dextrose 5%.  dextrose 40% Gel  insulin lispro (ADMELOG) corrective regimen sliding scale  multivitamin  senna  ondansetron Injectable  HYDROmorphone  Injectable  acetaminophen     Tablet ..  lactated ringers.  gabapentin  acetaminophen     Tablet ..  aprepitant  chlorhexidine 2% Cloths  povidone iodine 5% Nasal Swab    Medications:  melatonin  polyethylene glycol 3350  HYDROmorphone  Injectable  oxyCODONE    IR  oxyCODONE    IR  pantoprazole    Tablet  insulin lispro (ADMELOG) corrective regimen sliding scale  ceFAZolin   IVPB  ceFAZolin   IVPB  ondansetron Injectable  naloxone Injectable  HYDROmorphone PCA (1 mG/mL) Rescue [facility] Bolus  HYDROmorphone PCA (1 mG/mL)  acetaminophen     Tablet ..  cyclobenzaprine  glucagon  Injectable  dextrose 5%.  dextrose 50% Injectable  dextrose 50% Injectable  dextrose 50% Injectable  dextrose 5%.  dextrose 40% Gel  insulin lispro (ADMELOG) corrective regimen sliding scale  multivitamin  senna  ondansetron Injectable  HYDROmorphone  Injectable  acetaminophen     Tablet ..  lactated ringers.  gabapentin  acetaminophen     Tablet ..  aprepitant  chlorhexidine 2% Cloths  povidone iodine 5% Nasal Swab    ROS: Const:  __N_febrile   Eyes:___ENT:___CV: _N__chest pain  Resp: _N___sob  GI:__N_nausea __N_vomiting ____abd pain ___npo ___clears __Y_full diet __bm  :___ Musk: _Y__pain ___spasm  Skin:___ Neuro:  __N_sedation_N__confusion___N_ numbness __N_weakness ___paresthesia  Psych:___anxiety  Endo:___ Heme:___Allergy:_NKDA__      11-11 @ 07:04423 mL/min/1.73M2  Hemoglobin: 12.1 g/dL (11-11 @ 07:12)  Hemoglobin: 12.7 g/dL (11-10 @ 07:31)  T(C): 36.9 (11-11-21 @ 08:30), Max: 36.9 (11-10-21 @ 16:31)  HR: 72 (11-11-21 @ 08:30) (72 - 85)  BP: 108/64 (11-11-21 @ 08:30) (104/62 - 111/69)  RR: 15 (11-11-21 @ 08:30) (15 - 18)  SpO2: 98% (11-11-21 @ 08:30) (97% - 99%)  Wt(kg): --     PHYSICAL EXAM:  Gen Appearance: X___no acute distress __X_appropriate       Neuro: ___SILT feet____ EOM Intact Psych: AAOX_3_, X___mood/affect appropriate        Eyes: _X__conjunctiva WNL  __X___ Pupils equal and round        ENT: _X__ears and nose atraumatic__X_ Hearing grossly intact        Neck: _X__trachea midline, no visible masses ___thyroid without palpable mass    Resp: __X_Nml WOB____No tactile fremitus ___clear to auscultation    Cardio: _X__extremities free from edema ____pedal pulses palpable    GI/Abdomen: ___soft _____ Nontender____X__Nondistended_____HSM    Lymphatic: ___no palpable nodes in neck  ___no palpable nodes calves and feet    Skin/Wound: ___Incision, _X__Dressing c/d/i,   ____surrounding tissues soft,  ___drain/chest tube present____    Muscular: EHL ___/5  Gastrocnemius___/5    _X__absent clubbing/cyanosis         ASSESSMENT:  This is a 56y old Male with a history of  DM POD 2 s/p lumbar decompression and fusion L5-S1 with Dr. Dale, doing well.    Recommended Treatment PLAN:  1. Oxycodone 5-10 mg PO Q4h PRN moderate-severe pain  2. Dilaudid 0.5 mg IVP Q4h PRN breakthrough pain  3. Tylenol 975 mg PO TID  4. Flexeril 5 mg PO TID  5. Encourage bowel regimen  Plan discussed with Dr. Hidalgo     Pain Management Progress Note - [facility] [phone]    HPI: Patient seen and examined today. Patient reports endorsing moderate low back pain. Patient reports back pain is improved with PO Oxycodone. Patient reports endorsing constipation, but denies other side effects from current pain regimen. Patient reports adequate pain control with current pain regimen.    Pertinent PMH: Pain at: __X_Back ___Neck___Knee ___Hip ___Shoulder ___ Opioid tolerance    Pain is __X_ sharp ____dull ___burning ___achy ___ Intensity: ____ mild __X__mod __X__severe     Location ____X_surgical site _____cervical ____X_lumbar ____abd _____upper ext____lower ext    Worse with __X__activity __X__movement _____physical therapy___ Rest    Improved with ___X_medication __X__rest ____physical therapy    PMH:  Lumbar radiculopathy  DM (diabetes mellitus)  Back pain  fall at work in 2020, back injury  No significant past surgical history    Medications:  HYDROmorphone  Injectable  oxyCODONE    IR  oxyCODONE    IR  pantoprazole    Tablet  insulin lispro (ADMELOG) corrective regimen sliding scale  ceFAZolin   IVPB  ceFAZolin   IVPB  ondansetron Injectable  naloxone Injectable  HYDROmorphone PCA (1 mG/mL) Rescue [facility] Bol[facility]  HYDROmorphone PCA (1 mG/mL)  acetaminophen     Tablet ..  cyclobenzaprine  glucagon  Injectable  dextrose 5%.  dextrose 50% Injectable  dextrose 50% Injectable  dextrose 50% Injectable  dextrose 5%.  dextrose 40% Gel  insulin lispro (ADMELOG) corrective regimen sliding scale  multivitamin  senna  ondansetron Injectable  HYDROmorphone  Injectable  acetaminophen     Tablet ..  lactated ringers.  gabapentin  acetaminophen     Tablet ..  aprepitant  chlorhexidine 2% Cloths  povidone iodine 5% Nasal Swab    Medications:  melatonin  polyethylene glycol 3350  HYDROmorphone  Injectable  oxyCODONE    IR  oxyCODONE    IR  pantoprazole    Tablet  insulin lispro (ADMELOG) corrective regimen sliding scale  ceFAZolin   IVPB  ceFAZolin   IVPB  ondansetron Injectable  naloxone Injectable  HYDROmorphone PCA (1 mG/mL) Rescue [facility] Bol[facility]  HYDROmorphone PCA (1 mG/mL)  acetaminophen     Tablet ..  cyclobenzaprine  glucagon  Injectable  dextrose 5%.  dextrose 50% Injectable  dextrose 50% Injectable  dextrose 50% Injectable  dextrose 5%.  dextrose 40% Gel  insulin lispro (ADMELOG) corrective regimen sliding scale  multivitamin  senna  ondansetron Injectable  HYDROmorphone  Injectable  acetaminophen     Tablet ..  lactated ringers.  gabapentin  acetaminophen     Tablet ..  aprepitant  chlorhexidine 2% Cloths  povidone iodine 5% Nasal Swab    ROS: Const:  __N_febrile   Eyes:___ENT:___CV: _N__chest pain  Resp: _N___sob  GI:__N_nausea __N_vomiting ____abd pain ___npo ___clears __Y_full diet __bm  :___ Musk: _Y__pain ___spasm  Skin:___ Neuro:  __N_sedation_N__confusion___N_ numbness __N_weakness ___paresthesia  Psych:___anxiety  Endo:___ Heme:___Allergy:_NKDA__      11-11 @ 07:33887 mL/min/1.73M2  Hemoglobin: 12.1 g/dL (11-11 @ 07:12)  Hemoglobin: 12.7 g/dL (11-10 @ 07:31)  T(C): 36.9 (11-11-21 @ 08:30), Max: 36.9 (11-10-21 @ 16:31)  HR: 72 (11-11-21 @ 08:30) (72 - 85)  BP: 108/64 (11-11-21 @ 08:30) (104/62 - 111/69)  RR: 15 (11-11-21 @ 08:30) (15 - 18)  SpO2: 98% (11-11-21 @ 08:30) (97% - 99%)  Wt(kg): --     PHYSICAL EXAM:  Gen Appearance: X___no acute distress __X_appropriate       Neuro: ___SILT feet____ EOM Intact Psych: AAOX_3_, X___mood/affect appropriate        Eyes: _X__conjunctiva WNL  __X___ Pupils equal and round        ENT: _X__ears and nose atraumatic__X_ Hearing grossly intact        Neck: _X__trachea midline, no visible masses ___thyroid without palpable mass    Resp: __X_Nml WOB____No tactile fremitus ___clear to auscultation    Cardio: _X__extremities free from edema ____pedal pulses palpable    GI/Abdomen: ___soft _____ Nontender____X__Nondistended_____HSM    Lymphatic: ___no palpable nodes in neck  ___no palpable nodes calves and feet    Skin/Wound: ___Incision, _X__Dressing c/d/i,   ____surrounding tissues soft,  ___drain/chest tube present____    Muscular: EHL ___/5  Gastrocnemius___/5    _X__absent clubbing/cyanosis         ASSESSMENT:  This is a 56y old Male with a history of  DM POD 2 s/p lumbar decompression and fusion L5-S1 with Dr. Dale, doing well.    Recommended Treatment PLAN:  1. Oxycodone 5-10 mg PO Q4h PRN moderate-severe pain  2. Dilaudid 0.5 mg IVP Q4h PRN breakthrough pain  3. Tylenol 975 mg PO TID  4. Flexeril 5 mg PO TID  5. Encourage bowel regimen  Plan discussed with Dr. Hidalgo

## 2021-11-12 LAB
ANION GAP SERPL CALC-SCNC: 9 MMOL/L — SIGNIFICANT CHANGE UP (ref 5–17)
BASOPHILS # BLD AUTO: 0.02 K/UL — SIGNIFICANT CHANGE UP (ref 0–0.2)
BASOPHILS NFR BLD AUTO: 0.3 % — SIGNIFICANT CHANGE UP (ref 0–2)
BUN SERPL-MCNC: 10 MG/DL — SIGNIFICANT CHANGE UP (ref 7–23)
CALCIUM SERPL-MCNC: 9.2 MG/DL — SIGNIFICANT CHANGE UP (ref 8.4–10.5)
CHLORIDE SERPL-SCNC: 101 MMOL/L — SIGNIFICANT CHANGE UP (ref 96–108)
CO2 SERPL-SCNC: 28 MMOL/L — SIGNIFICANT CHANGE UP (ref 22–31)
CREAT SERPL-MCNC: 0.78 MG/DL — SIGNIFICANT CHANGE UP (ref 0.5–1.3)
EOSINOPHIL # BLD AUTO: 0.04 K/UL — SIGNIFICANT CHANGE UP (ref 0–0.5)
EOSINOPHIL NFR BLD AUTO: 0.6 % — SIGNIFICANT CHANGE UP (ref 0–6)
GLUCOSE BLDC GLUCOMTR-MCNC: 110 MG/DL — HIGH (ref 70–99)
GLUCOSE BLDC GLUCOMTR-MCNC: 110 MG/DL — HIGH (ref 70–99)
GLUCOSE BLDC GLUCOMTR-MCNC: 171 MG/DL — HIGH (ref 70–99)
GLUCOSE BLDC GLUCOMTR-MCNC: 206 MG/DL — HIGH (ref 70–99)
GLUCOSE SERPL-MCNC: 129 MG/DL — HIGH (ref 70–99)
HCT VFR BLD CALC: 36.4 % — LOW (ref 39–50)
HGB BLD-MCNC: 12 G/DL — LOW (ref 13–17)
IMM GRANULOCYTES NFR BLD AUTO: 0.3 % — SIGNIFICANT CHANGE UP (ref 0–1.5)
LYMPHOCYTES # BLD AUTO: 1.61 K/UL — SIGNIFICANT CHANGE UP (ref 1–3.3)
LYMPHOCYTES # BLD AUTO: 25 % — SIGNIFICANT CHANGE UP (ref 13–44)
MCHC RBC-ENTMCNC: 31.6 PG — SIGNIFICANT CHANGE UP (ref 27–34)
MCHC RBC-ENTMCNC: 33 GM/DL — SIGNIFICANT CHANGE UP (ref 32–36)
MCV RBC AUTO: 95.8 FL — SIGNIFICANT CHANGE UP (ref 80–100)
MONOCYTES # BLD AUTO: 0.88 K/UL — SIGNIFICANT CHANGE UP (ref 0–0.9)
MONOCYTES NFR BLD AUTO: 13.7 % — SIGNIFICANT CHANGE UP (ref 2–14)
NEUTROPHILS # BLD AUTO: 3.87 K/UL — SIGNIFICANT CHANGE UP (ref 1.8–7.4)
NEUTROPHILS NFR BLD AUTO: 60.1 % — SIGNIFICANT CHANGE UP (ref 43–77)
NRBC # BLD: 0 /100 WBCS — SIGNIFICANT CHANGE UP (ref 0–0)
PLATELET # BLD AUTO: 197 K/UL — SIGNIFICANT CHANGE UP (ref 150–400)
POTASSIUM SERPL-MCNC: 4.7 MMOL/L — SIGNIFICANT CHANGE UP (ref 3.5–5.3)
POTASSIUM SERPL-SCNC: 4.7 MMOL/L — SIGNIFICANT CHANGE UP (ref 3.5–5.3)
RBC # BLD: 3.8 M/UL — LOW (ref 4.2–5.8)
RBC # FLD: 12.1 % — SIGNIFICANT CHANGE UP (ref 10.3–14.5)
SODIUM SERPL-SCNC: 138 MMOL/L — SIGNIFICANT CHANGE UP (ref 135–145)
WBC # BLD: 6.44 K/UL — SIGNIFICANT CHANGE UP (ref 3.8–10.5)
WBC # FLD AUTO: 6.44 K/UL — SIGNIFICANT CHANGE UP (ref 3.8–10.5)

## 2021-11-12 RX ORDER — CYCLOBENZAPRINE HYDROCHLORIDE 10 MG/1
1 TABLET, FILM COATED ORAL
Qty: 21 | Refills: 0
Start: 2021-11-12 | End: 2021-11-18

## 2021-11-12 RX ORDER — OXYCODONE HYDROCHLORIDE 5 MG/1
1 TABLET ORAL
Qty: 42 | Refills: 0
Start: 2021-11-12 | End: 2021-11-18

## 2021-11-12 RX ORDER — CEPHALEXIN 500 MG
1 CAPSULE ORAL
Qty: 14 | Refills: 0
Start: 2021-11-12 | End: 2021-11-18

## 2021-11-12 RX ADMIN — Medication 975 MILLIGRAM(S): at 13:30

## 2021-11-12 RX ADMIN — SENNA PLUS 2 TABLET(S): 8.6 TABLET ORAL at 21:20

## 2021-11-12 RX ADMIN — Medication 975 MILLIGRAM(S): at 14:30

## 2021-11-12 RX ADMIN — CYCLOBENZAPRINE HYDROCHLORIDE 5 MILLIGRAM(S): 10 TABLET, FILM COATED ORAL at 13:33

## 2021-11-12 RX ADMIN — PANTOPRAZOLE SODIUM 40 MILLIGRAM(S): 20 TABLET, DELAYED RELEASE ORAL at 06:04

## 2021-11-12 RX ADMIN — CYCLOBENZAPRINE HYDROCHLORIDE 5 MILLIGRAM(S): 10 TABLET, FILM COATED ORAL at 21:20

## 2021-11-12 RX ADMIN — Medication 4: at 22:17

## 2021-11-12 RX ADMIN — Medication 2: at 12:01

## 2021-11-12 RX ADMIN — Medication 975 MILLIGRAM(S): at 22:20

## 2021-11-12 RX ADMIN — Medication 975 MILLIGRAM(S): at 05:47

## 2021-11-12 RX ADMIN — POLYETHYLENE GLYCOL 3350 17 GRAM(S): 17 POWDER, FOR SOLUTION ORAL at 13:29

## 2021-11-12 RX ADMIN — OXYCODONE HYDROCHLORIDE 10 MILLIGRAM(S): 5 TABLET ORAL at 02:12

## 2021-11-12 RX ADMIN — Medication 975 MILLIGRAM(S): at 06:15

## 2021-11-12 RX ADMIN — Medication 975 MILLIGRAM(S): at 21:20

## 2021-11-12 RX ADMIN — OXYCODONE HYDROCHLORIDE 10 MILLIGRAM(S): 5 TABLET ORAL at 03:00

## 2021-11-12 RX ADMIN — Medication 1 TABLET(S): at 13:29

## 2021-11-12 RX ADMIN — CYCLOBENZAPRINE HYDROCHLORIDE 5 MILLIGRAM(S): 10 TABLET, FILM COATED ORAL at 05:47

## 2021-11-12 NOTE — PROGRESS NOTE ADULT - SUBJECTIVE AND OBJECTIVE BOX
Pt comfortable without complaints, pain controlled with current pain medication regimen.   Denies CP, SOB, N/V, numbness/tingling     Objective:    Physical Exam:  General: Pt Alert and oriented, NAD   dressing c,d,i, x1 hv drain   Pulses: +2 pedal pulses,  Sensation: silt intact  Motor: EHL/FHL/TA/GS- firing    Plan of Care:    - afebrile, nontoxic appearance  - Pain Control-     - DVT ppx: scds  - bowel regimen, IS use   - appreciate Pain Management Recs  - Dispo- to home

## 2021-11-12 NOTE — PROGRESS NOTE ADULT - ATTENDING COMMENTS
56y old Male with a history of DM POD # 3, s/p lumbar decompression and fusion L5-S1. Pt evaluated for the pain control. Chart reviewed, medications/allergies reviewed. Reports adequate pain control on current regimen w/o any side effects. Pain management plan discussed, Q's answered. We will continue comprehensive medical regimen and to pain control and side effects. We will follow up. Dr. Hidalgo
56y old Male with a history of DM POD # 1, s/p lumbar decompression and fusion L5-S1. Pt evaluated for the pain control. Chart reviewed, medications/allergies reviewed. Reports adequate pain control on current regimen w/o any side effects. Pain management plan discussed, Q's answered. We will continue comprehensive medical regimen and to pain control and side effects. We will follow up. Dr. Hidalgo
56y old Male with a history of DM POD # 2, s/p lumbar decompression and fusion L5-S1. Pt evaluated for the pain control. Chart reviewed, medications/allergies reviewed. Reports adequate pain control on current regimen w/o any side effects. Pain management plan discussed, Q's answered. We will continue comprehensive medical regimen and to pain control and side effects. We will follow up. Dr. Hidalgo

## 2021-11-12 NOTE — PROGRESS NOTE ADULT - SUBJECTIVE AND OBJECTIVE BOX
Pain Management Progress Note - [facility] [phone]    HPI: Patient seen and examined today. Patient reports tolerable level of low back pain today rated a 7 out of 10. Patient reports adequate pain control with PO Oxycodone, Patient reports endorsing constipation but denies other side effects from current pain regimen.   reference ID: [id number]    Pertinent PMH: Pain at: _X__Back ___Neck___Knee ___Hip ___Shoulder ___ Opioid tolerance    Pain is __X_ sharp ____dull ___burning ___achy ___ Intensity: ____ mild __X__mod ___X_severe     Location __X___surgical site _____cervical ___X__lumbar ____abd _____upper ext____lower ext    Worse with ___X_activity __X__movement _____physical therapy___ Rest    Improved with __X__medication _X___rest ____physical therapy    PMH:  Lumbar radiculopathy  DM (diabetes mellitus)  Back pain  fall at work in 2020, back injury  No significant past surgical history    Medications:  melatonin  polyethylene glycol 3350  HYDROmorphone  Injectable  oxyCODONE    IR  oxyCODONE    IR  pantoprazole    Tablet  insulin lispro (ADMELOG) corrective regimen sliding scale  ceFAZolin   IVPB  ceFAZolin   IVPB  ondansetron Injectable  naloxone Injectable  HYDROmorphone PCA (1 mG/mL) Rescue [facility] Bolus  HYDROmorphone PCA (1 mG/mL)  acetaminophen     Tablet ..  cyclobenzaprine  glucagon  Injectable  dextrose 5%.  dextrose 50% Injectable  dextrose 50% Injectable  dextrose 50% Injectable  dextrose 5%.  dextrose 40% Gel  insulin lispro (ADMELOG) corrective regimen sliding scale  multivitamin  senna  ondansetron Injectable  HYDROmorphone  Injectable  acetaminophen     Tablet ..  lactated ringers.  gabapentin  acetaminophen     Tablet ..  aprepitant  chlorhexidine 2% Cloths  povidone iodine 5% Nasal Swab    ROS: Const:  __N_febrile   Eyes:___ENT:___CV: ___Nchest pain  Resp: N____sob  GI:_N__nausea _N__vomiting __Y__abd pain ___npo ___clears _Y__full diet __bm  :___ Musk: _Y__pain ___spasm  Skin:___ Neuro:  __N_sedation__N_confusion__N__ numbness _N__weakness __N_paresthesia  Psych:___anxiety  Endo:___ Heme:___Allergy:__NKDA_      11-12 @ 07:79284 mL/min/1.73M2  Hemoglobin: 12.0 g/dL (11-12 @ 07:01)  Hemoglobin: 12.1 g/dL (11-11 @ 07:12)  T(C): 36.9 (11-12-21 @ 08:21), Max: 37.2 (11-11-21 @ 20:33)  HR: 75 (11-12-21 @ 08:21) (75 - 95)  BP: 112/73 (11-12-21 @ 08:21) (112/73 - 129/72)  RR: 16 (11-12-21 @ 08:21) (16 - 18)  SpO2: 95% (11-12-21 @ 08:21) (95% - 99%)  Wt(kg): --     PHYSICAL EXAM:  Gen Appearance: __X_no acute distress _X__appropriate       Neuro: ___SILT feet____ EOM Intact Psych: AAOX_3_, _X__mood/affect appropriate        Eyes: __X_conjunctiva WNL  __X___ Pupils equal and round        ENT: __X_ears and nose atraumatic__X_ Hearing grossly intact        Neck: _X__trachea midline, no visible masses ___thyroid without palpable mass    Resp: __X_Nml WOB____No tactile fremitus ___clear to auscultation    Cardio: __X_extremities free from edema ____pedal pulses palpable    GI/Abdomen: __X_soft _____ Nontender______Nondistended_____HSM    Lymphatic: ___no palpable nodes in neck  ___no palpable nodes calves and feet    Skin/Wound: ___Incision, __X_Dressing c/d/i,   ____surrounding tissues soft,  ___drain/chest tube present____    Muscular: EHL ___/5  Gastrocnemius___/5    X___absent clubbing/cyanosis         ASSESSMENT:  This is a 56y old Male with a history of DM POD 3 s/p lumbar decompression and fusion L5-S1 with Dr. Dale., doing well.    Recommended Treatment PLAN:  1. Oxycodone 5-10 mg PO Q4h PRN moderate-severe pain  2. Dilaudid 0.5 mg IVP Q4h PRN breakthrough pain  3. Tylenol 975 mg PO TID  4. Flexeril 5 mg PO TID  5. Encourage bowel regimen  Plan discussed with Dr. Hidalgo

## 2021-11-12 NOTE — PROGRESS NOTE ADULT - SUBJECTIVE AND OBJECTIVE BOX
Ortho Note    Subjective:  Pt comfortable without complaints, pain controlled with current pain management.   Denies CP, SOB, N/V, numbness/tingling     Vital Signs Last 24 Hrs  T(C): 36.9 (11-12-21 @ 08:21), Max: 36.9 (11-12-21 @ 08:21)  T(F): 98.5 (11-12-21 @ 08:21), Max: 98.5 (11-12-21 @ 08:21)  HR: 75 (11-12-21 @ 08:21) (75 - 75)  BP: 112/73 (11-12-21 @ 08:21) (112/73 - 112/73)  BP(mean): --  RR: 16 (11-12-21 @ 08:21) (16 - 16)  SpO2: 95% (11-12-21 @ 08:21) (95% - 95%)  AVSS    Objective:    Physical Exam:  General: Pt Alert and oriented, NAD  lumbar DSG C/D/I. x1hv   Pulses: +2 pedal pulses, wwp toes, cap refill less than 3 seconds  Sensation: silt intact  Motor: EHL/FHL/TA/GS- firing          Plan of Care:  A/P: 56yMale POD#3 s/p L5-S1 decompression and fusion  - afebrile, nontoxic appearance  - Pain Control- tylenol 975mg PO TID, Dilaudid 0.5mg Q4h prn breakthrough pain, Flexeril 5mg PO TID, oxycodone 5-10mg PO Q4h prn moderate to severe pain   - DVT ppx: scds  - PT, WBS: WBAT  - bowel regimen, IS use  - monitor drain output  - Dispo- home pending pt clearance and drain d/c    Ortho Pager [phone]

## 2021-11-12 NOTE — DIETITIAN INITIAL EVALUATION ADULT. - OTHER INFO
56M with hx of DM (A1C 6.0%- well controlled), lumbar radiculopathy, presenting with low back pain since 2020 s/p fall at work. Pt now s/p L5-S1 decompression and fusion 11/9.     On assessment, pt resting in bed without complaints. Currently on CST CHO diet tolerating Po well. Pt consistently meeting >50% of meals. No reported n/v/d- started on bowel regime. No abd pain or distention noted. Education provided on importance of adequate PO intake with emphasis on lean protein to support wound healing- pt receptive. Pt with hx of DM (A1C 6.0%- well controlled)- follows CST CHO diet at home. Pt appears well educated on diet guidelines. Pt noting good appetite PTA. UBW is consistent with admission weight- pt denies weight changes. NKFA. Skin: Surgical incisions, roxanne score 20. Pain: 8/10 back- improved with current pain regimen. Please see nutrition recs below. RD to follow

## 2021-11-12 NOTE — DIETITIAN INITIAL EVALUATION ADULT. - ADD RECOMMEND
Lutheran/Baptism considerations 1. pain and bowel regimen per team 2. Cont to monitor lytes and replete prn 3. RD diet edu prn

## 2021-11-12 NOTE — PROGRESS NOTE ADULT - SUBJECTIVE AND OBJECTIVE BOX
POST OPERATIVE DAY #: 3  STATUS POST: PSF LAMI L5-S1                      SUBJECTIVE: Patient seen and examined. Pt. states he feels better since he was very hungry from yesterday. Pt. c/o pain in his low back.   Denies any sob/cp/n/v/numbness or tingling in b/l les.     OBJECTIVE:     Vital Signs Last 24 Hrs  T(C): 36.8 (12 Nov 2021 16:04), Max: 37.2 (11 Nov 2021 20:33)  T(F): 98.2 (12 Nov 2021 16:04), Max: 99 (11 Nov 2021 20:33)  HR: 97 (12 Nov 2021 16:04) (75 - 97)  BP: 123/82 (12 Nov 2021 16:04) (112/73 - 123/82)  BP(mean): --  RR: 16 (12 Nov 2021 16:04) (16 - 18)  SpO2: 98% (12 Nov 2021 16:04) (95% - 98%)    Affected extremity: bilateral lower extremities skin intact, no erythema/ecchymosis/sts         Dressing: saturated with blood, 1 drain         Sensation: intact to light touch to patient's baseline         Motor exam: EHL/TA/GS 5/5  Pulses 2+  HEENT neg  COR RRR  LUNGS clear bilat anteriorly  Abd Soft BS present             I&O's Detail    09 Nov 2021 07:01  -  10 Nov 2021 07:00  --------------------------------------------------------  IN:  Total IN: 0 mL    OUT:    Accordian (mL): 140 mL    Voided (mL): 1950 mL  Total OUT: 2090 mL    Total NET: -2090 mL      10 Nov 2021 07:01  -  10 Nov 2021 10:40  --------------------------------------------------------  IN:    Oral Fluid: 320 mL  Total IN: 320 mL    OUT:    Voided (mL): 850 mL  Total OUT: 850 mL    Total NET: -530 mL          LABS:                        12.7   12.14 )-----------( 211      ( 10 Nov 2021 07:31 )             37.8                         12.1   8.28  )-----------( 191      ( 11 Nov 2021 07:12 )             36.2     11-10    136  |  103  |  10  ----------------------------<  186<H>  4.3   |  24  |  0.70    Ca    9.1      10 Nov 2021 07:31    11-11    137  |  103  |  10  ----------------------------<  136<H>  4.0   |  27  |  0.77    Ca    8.7      11 Nov 2021 07:12            MEDICATIONS:    acetaminophen     Tablet .. 975 milliGRAM(s) Oral every 8 hours  cyclobenzaprine 5 milliGRAM(s) Oral three times a day  HYDROmorphone  Injectable 0.5 milliGRAM(s) IV Push every 15 minutes PRN  HYDROmorphone  Injectable 0.5 milliGRAM(s) IV Push every 4 hours PRN  ondansetron Injectable 4 milliGRAM(s) IV Push every 6 hours PRN  oxyCODONE    IR 5 milliGRAM(s) Oral every 4 hours PRN  oxyCODONE    IR 10 milliGRAM(s) Oral every 4 hours PRN          ASSESSMENT AND PLAN: 55yo Male s/p PSF LAMI L5-S1

## 2021-11-13 ENCOUNTER — TRANSCRIPTION ENCOUNTER (OUTPATIENT)
Age: 56
End: 2021-11-13

## 2021-11-13 VITALS
HEART RATE: 97 BPM | TEMPERATURE: 98 F | SYSTOLIC BLOOD PRESSURE: 121 MMHG | OXYGEN SATURATION: 95 % | RESPIRATION RATE: 17 BRPM | DIASTOLIC BLOOD PRESSURE: 83 MMHG

## 2021-11-13 LAB
ANION GAP SERPL CALC-SCNC: 13 MMOL/L — SIGNIFICANT CHANGE UP (ref 5–17)
BUN SERPL-MCNC: 14 MG/DL — SIGNIFICANT CHANGE UP (ref 7–23)
CALCIUM SERPL-MCNC: 9.6 MG/DL — SIGNIFICANT CHANGE UP (ref 8.4–10.5)
CHLORIDE SERPL-SCNC: 100 MMOL/L — SIGNIFICANT CHANGE UP (ref 96–108)
CO2 SERPL-SCNC: 24 MMOL/L — SIGNIFICANT CHANGE UP (ref 22–31)
CREAT SERPL-MCNC: 0.84 MG/DL — SIGNIFICANT CHANGE UP (ref 0.5–1.3)
GLUCOSE BLDC GLUCOMTR-MCNC: 140 MG/DL — HIGH (ref 70–99)
GLUCOSE BLDC GLUCOMTR-MCNC: 149 MG/DL — HIGH (ref 70–99)
GLUCOSE SERPL-MCNC: 155 MG/DL — HIGH (ref 70–99)
HCT VFR BLD CALC: 40 % — SIGNIFICANT CHANGE UP (ref 39–50)
HGB BLD-MCNC: 12.9 G/DL — LOW (ref 13–17)
MCHC RBC-ENTMCNC: 30.4 PG — SIGNIFICANT CHANGE UP (ref 27–34)
MCHC RBC-ENTMCNC: 32.3 GM/DL — SIGNIFICANT CHANGE UP (ref 32–36)
MCV RBC AUTO: 94.3 FL — SIGNIFICANT CHANGE UP (ref 80–100)
NRBC # BLD: 0 /100 WBCS — SIGNIFICANT CHANGE UP (ref 0–0)
PLATELET # BLD AUTO: 234 K/UL — SIGNIFICANT CHANGE UP (ref 150–400)
POTASSIUM SERPL-MCNC: 4.4 MMOL/L — SIGNIFICANT CHANGE UP (ref 3.5–5.3)
POTASSIUM SERPL-SCNC: 4.4 MMOL/L — SIGNIFICANT CHANGE UP (ref 3.5–5.3)
RBC # BLD: 4.24 M/UL — SIGNIFICANT CHANGE UP (ref 4.2–5.8)
RBC # FLD: 12.1 % — SIGNIFICANT CHANGE UP (ref 10.3–14.5)
SODIUM SERPL-SCNC: 137 MMOL/L — SIGNIFICANT CHANGE UP (ref 135–145)
WBC # BLD: 7.12 K/UL — SIGNIFICANT CHANGE UP (ref 3.8–10.5)
WBC # FLD AUTO: 7.12 K/UL — SIGNIFICANT CHANGE UP (ref 3.8–10.5)

## 2021-11-13 RX ADMIN — CYCLOBENZAPRINE HYDROCHLORIDE 5 MILLIGRAM(S): 10 TABLET, FILM COATED ORAL at 05:16

## 2021-11-13 RX ADMIN — Medication 975 MILLIGRAM(S): at 06:16

## 2021-11-13 RX ADMIN — Medication 10 MILLIGRAM(S): at 13:46

## 2021-11-13 RX ADMIN — Medication 975 MILLIGRAM(S): at 05:16

## 2021-11-13 RX ADMIN — Medication 1 TABLET(S): at 13:21

## 2021-11-13 RX ADMIN — PANTOPRAZOLE SODIUM 40 MILLIGRAM(S): 20 TABLET, DELAYED RELEASE ORAL at 05:15

## 2021-11-13 RX ADMIN — Medication 975 MILLIGRAM(S): at 13:20

## 2021-11-13 RX ADMIN — POLYETHYLENE GLYCOL 3350 17 GRAM(S): 17 POWDER, FOR SOLUTION ORAL at 13:20

## 2021-11-13 RX ADMIN — OXYCODONE HYDROCHLORIDE 10 MILLIGRAM(S): 5 TABLET ORAL at 04:30

## 2021-11-13 RX ADMIN — Medication 975 MILLIGRAM(S): at 14:45

## 2021-11-13 RX ADMIN — CYCLOBENZAPRINE HYDROCHLORIDE 5 MILLIGRAM(S): 10 TABLET, FILM COATED ORAL at 13:21

## 2021-11-13 RX ADMIN — OXYCODONE HYDROCHLORIDE 10 MILLIGRAM(S): 5 TABLET ORAL at 03:30

## 2021-11-13 NOTE — DISCHARGE NOTE NURSING/CASE MANAGEMENT/SOCIAL WORK - PATIENT PORTAL LINK FT
You can access the FollowMyHealth Patient Portal offered by [facility] by registering at the following website: [email or web address]. By joining [employer]’s FollowMyHealth portal, you will also be able to view your health information using other applications (apps) compatible with our system.

## 2021-11-13 NOTE — PROGRESS NOTE ADULT - SUBJECTIVE AND OBJECTIVE BOX
POST OPERATIVE DAY #: 4  STATUS POST: PSF LAMI L5-S1                      SUBJECTIVE: Patient seen and examined. Pt. states he feels better since he was very hungry from yesterday. Pt. c/o pain in his low back.   Denies any sob/cp/n/v/numbness or tingling in b/l les.     OBJECTIVE:     Vital Signs Last 24 Hrs  T(C): 36.8 (13 Nov 2021 08:56), Max: 37.1 (13 Nov 2021 05:15)  T(F): 98.2 (13 Nov 2021 08:56), Max: 98.8 (13 Nov 2021 05:15)  HR: 97 (13 Nov 2021 08:56) (92 - 97)  BP: 121/83 (13 Nov 2021 08:56) (101/64 - 121/83)  BP(mean): --  RR: 17 (13 Nov 2021 08:56) (16 - 17)  SpO2: 95% (13 Nov 2021 08:56) (95% - 95%)         Dressing: saturated with blood, 1 drain         Sensation: intact to light touch to patient's baseline         Motor exam: EHL/TA/GS 5/5  Pulses 2+  HEENT neg  COR RRR  LUNGS clear bilat anteriorly  Abd Soft BS present             I&O's Detail    09 Nov 2021 07:01  -  10 Nov 2021 07:00  --------------------------------------------------------  IN:  Total IN: 0 mL    OUT:    Accordian (mL): 140 mL    Voided (mL): 1950 mL  Total OUT: 2090 mL    Total NET: -2090 mL      10 Nov 2021 07:01  -  10 Nov 2021 10:40  --------------------------------------------------------  IN:    Oral Fluid: 320 mL  Total IN: 320 mL    OUT:    Voided (mL): 850 mL  Total OUT: 850 mL    Total NET: -530 mL          LABS:                          12.9   7.12  )-----------( 234      ( 13 Nov 2021 08:42 )             40.0                       12.7   12.14 )-----------( 211      ( 10 Nov 2021 07:31 )             37.8                         12.1   8.28  )-----------( 191      ( 11 Nov 2021 07:12 )             36.2     11-10    136  |  103  |  10  ----------------------------<  186<H>  4.3   |  24  |  0.70    Ca    9.1      10 Nov 2021 07:31    11-11    137  |  103  |  10  ----------------------------<  136<H>  4.0   |  27  |  0.77    Ca    8.7      11 Nov 2021 07:12            MEDICATIONS:    acetaminophen     Tablet .. 975 milliGRAM(s) Oral every 8 hours  cyclobenzaprine 5 milliGRAM(s) Oral three times a day  HYDROmorphone  Injectable 0.5 milliGRAM(s) IV Push every 15 minutes PRN  HYDROmorphone  Injectable 0.5 milliGRAM(s) IV Push every 4 hours PRN  ondansetron Injectable 4 milliGRAM(s) IV Push every 6 hours PRN  oxyCODONE    IR 5 milliGRAM(s) Oral every 4 hours PRN  oxyCODONE    IR 10 milliGRAM(s) Oral every 4 hours PRN          ASSESSMENT AND PLAN: 57yo Male s/p PSF LAMI L5-S1

## 2021-11-13 NOTE — PROGRESS NOTE ADULT - PROVIDER SPECIALTY LIST ADULT
Pain Medicine
Internal Medicine
Internal Medicine
Orthopedics
Pain Medicine
Internal Medicine
Orthopedics
Pain Medicine
Internal Medicine

## 2021-11-13 NOTE — PROGRESS NOTE ADULT - REASON FOR ADMISSION
Low back pain

## 2021-11-13 NOTE — DISCHARGE NOTE NURSING/CASE MANAGEMENT/SOCIAL WORK - NSDCPEFALRISK_GEN_ALL_CORE
For information on Fall & injury Prevention, visit [email or web address]

## 2021-11-13 NOTE — PROGRESS NOTE ADULT - SUBJECTIVE AND OBJECTIVE BOX
Pt comfortable without complaints, pain controlled with current pain medication regimen.   Denies CP, SOB, N/V, numbness/tingling     Objective:    Physical Exam:  General: Pt Alert and oriented, NAD   dressing c,d,i, x1 hv drain   Pulses: +2 pedal pulses,  Sensation: silt intact  Motor: EHL/FHL/TA/GS- firing    Plan of Care:    - afebrile, nontoxic appearance  - Pain Control-     - DVT ppx: scds  - PT, WBS: WBAT  - bowel regimen, IS use   - appreciate Pain Management Recs  - Dispo- to home

## 2021-11-13 NOTE — PROGRESS NOTE ADULT - ASSESSMENT
POD nu 1  s/p L spine fusion    OOB  PT  Diet  VTE: scd  Pain: controlled  
POD nu 1  s/p L spine fusion    OOB  PT  Diet  VTE: scd  Pain: controlled  Drain as per ortho
POD nu 1  s/p L spine fusion    OOB  PT  Diet  VTE: scd  Pain: controlled  
POD nu 1  s/p L spine fusion    OOB  PT  Diet  VTE: scd  Pain: controlled

## 2021-11-22 DIAGNOSIS — M54.16 RADICULOPATHY, LUMBAR REGION: ICD-10-CM

## 2021-11-22 DIAGNOSIS — E78.5 HYPERLIPIDEMIA, UNSPECIFIED: ICD-10-CM

## 2021-11-22 DIAGNOSIS — K59.00 CONSTIPATION, UNSPECIFIED: ICD-10-CM

## 2021-11-22 DIAGNOSIS — T40.2X5A ADVERSE EFFECT OF OTHER OPIOIDS, INITIAL ENCOUNTER: ICD-10-CM

## 2021-11-22 DIAGNOSIS — Z79.84 LONG TERM (CURRENT) USE OF ORAL HYPOGLYCEMIC DRUGS: ICD-10-CM

## 2021-11-22 DIAGNOSIS — E11.40 TYPE 2 DIABETES MELLITUS WITH DIABETIC NEUROPATHY, UNSPECIFIED: ICD-10-CM

## 2021-11-22 DIAGNOSIS — K59.03 DRUG INDUCED CONSTIPATION: ICD-10-CM

## 2021-12-02 PROCEDURE — C1889: CPT

## 2021-12-02 PROCEDURE — 86803 HEPATITIS C AB TEST: CPT

## 2021-12-02 PROCEDURE — 86900 BLOOD TYPING SEROLOGIC ABO: CPT

## 2021-12-02 PROCEDURE — 85027 COMPLETE CBC AUTOMATED: CPT

## 2021-12-02 PROCEDURE — 97161 PT EVAL LOW COMPLEX 20 MIN: CPT

## 2021-12-02 PROCEDURE — C9399: CPT

## 2021-12-02 PROCEDURE — C1713: CPT

## 2021-12-02 PROCEDURE — 97116 GAIT TRAINING THERAPY: CPT

## 2021-12-02 PROCEDURE — 85025 COMPLETE CBC W/AUTO DIFF WBC: CPT

## 2021-12-02 PROCEDURE — 76000 FLUOROSCOPY <1 HR PHYS/QHP: CPT

## 2021-12-02 PROCEDURE — 86901 BLOOD TYPING SEROLOGIC RH(D): CPT

## 2021-12-02 PROCEDURE — 86850 RBC ANTIBODY SCREEN: CPT

## 2021-12-02 PROCEDURE — 72100 X-RAY EXAM L-S SPINE 2/3 VWS: CPT

## 2021-12-02 PROCEDURE — 83036 HEMOGLOBIN GLYCOSYLATED A1C: CPT

## 2021-12-02 PROCEDURE — 82962 GLUCOSE BLOOD TEST: CPT

## 2021-12-02 PROCEDURE — 80048 BASIC METABOLIC PNL TOTAL CA: CPT

## 2021-12-02 PROCEDURE — 36415 COLL VENOUS BLD VENIPUNCTURE: CPT

## 2021-12-02 PROCEDURE — 86769 SARS-COV-2 COVID-19 ANTIBODY: CPT

## 2022-02-24 NOTE — PATIENT PROFILE ADULT - CAREGIVER ADDRESS
/
Pt c/o chest pain and multiple syncopal episodes, more recent at 1 am with +LOC and head injury. Pt with internal defibrillator and endorses "It went off last night." Pt on ASA 81 mg daily.

## 2022-10-12 NOTE — PATIENT PROFILE ADULT - MEDICATIONS/VISITS
Writer left 2nd message for patient to call office and schedule pre-procedure Covid-19  screening and informing patient that screening must be completed to have procedure. Number given.    
Writer left message for patient to call office and schedule pre-procedure Covid-19  screening and informing patient that screening must be completed to have procedure. Number given.    
Writer spoke with patient for the [facility], Dr. Choudhary,regarding upcoming procedure on 10/20.  We do require a COVID test 48-72 hours prior to that procedure. I am calling to make that appointment for you.   Covid screening appointment date of 10/17.  You are to go to the [place] [facility] for you screening. You check in to a lab .    Patient understood and had no questions. Test ordered.    
no

## 2023-03-27 NOTE — BRIEF OPERATIVE NOTE - ANTIBIOTIC PROTOCOL
"No, due to colon polyps present on previous colonoscopy. His last colonoscopy in Sept 2021, Dr. Kessler noted \"Please reschedule a colonoscopy for him in 3-4 months with 2 day bowel cleanse.\"  I'm unable to find the repeat colonoscopy after Sept 2021.  " Followed protocol

## 2024-02-28 NOTE — CONSULT NOTE ADULT - ASSESSMENT
no answer/reply/no
yes
NO replay or anwer from provider/no
POD nu O  s/p L spine  DM_ FSBS are reviewed.    Pain management  OOB  VTS: SCD  Diet: